# Patient Record
Sex: FEMALE | Race: WHITE | NOT HISPANIC OR LATINO | Employment: FULL TIME | URBAN - METROPOLITAN AREA
[De-identification: names, ages, dates, MRNs, and addresses within clinical notes are randomized per-mention and may not be internally consistent; named-entity substitution may affect disease eponyms.]

---

## 2018-07-02 ENCOUNTER — TRANSFERRED RECORDS (OUTPATIENT)
Dept: HEALTH INFORMATION MANAGEMENT | Facility: CLINIC | Age: 33
End: 2018-07-02

## 2018-07-12 ENCOUNTER — TELEPHONE (OUTPATIENT)
Dept: OTOLARYNGOLOGY | Facility: CLINIC | Age: 33
End: 2018-07-12

## 2018-07-12 NOTE — TELEPHONE ENCOUNTER
Health Call Center    Phone Message    May a detailed message be left on voicemail: no    Reason for Call: Other: New Patient, being Referred by [facility] for DX: Cyst on Salivary Gland + Cheek Abscess for surgery. Please follow-up as patient has had hospitalization for infection due to DX.      Action Taken: Message routed to:  [facility] ([facility] ent

## 2018-07-17 ENCOUNTER — TELEPHONE (OUTPATIENT)
Dept: CALL CENTER | Age: 33
End: 2018-07-17

## 2018-07-17 NOTE — TELEPHONE ENCOUNTER
Scheduled patient to see ENT 7/31, she wanted to urgently to speak to a nurse about the blood that was coming out of her ear. I asked her if anyone from the call center offered to transfer her to the red flags triage line, she stated no. I called over and transferred her to a nurse on the red flags triage line for further assistance on her bleeding ear.

## 2018-07-17 NOTE — TELEPHONE ENCOUNTER
[facility]: Nurse Triage Note  SITUATION/BACKGROUND                                                      Martita Salazar is a 32 year old female who calls with draining ear and pain.    Description:  Right ear drainage and bleeding  Onset/duration:  3 weeks ago  Precip. factors:  Swelling of right cheek with pain  Associated symptoms:  Low grade fever  Improves/worsens symptoms:  nothing  Pain scale (0-10)   NA    MEDICATIONS:   Taking medication(s) as prescribed? Yes 1 week of Augmentin PO and Cipro drops  Taking over the counter medication(s?) N/A  Any barriers to taking medication(s) as prescribed?  No  Medication(s) improving/managing symptoms?  No    Allergies: Allergies not on file    ASSESSMENT      Patient has been treated for the parotid abscess in [facility] - continues to have drainage with pain. Bright red blood at times. She has an appointment 7-31-18 to see Dr Holcomb in ENT. Appointment is changed to tomorrow at 10 AM with Dr Viera for more timely assessment and treatment.    RECOMMENDATION/PLAN                                                      RECOMMENDED DISPOSITION:  See in 24 hours -   Will comply with recommendation: Yes    If further questions/concerns or if symptoms do not improve, worsen or new symptoms develop, call your PCP or [phone] to talk with the Resident on call, as soon as possible.    Guideline used: Ear Drainage pg 200  Telephone Triage Protocols for Nurses, Fifth Edition, Julie Briggs Kathleen M. Doege, RN

## 2018-07-17 NOTE — TELEPHONE ENCOUNTER
FUTURE VISIT INFORMATION      FUTURE VISIT INFORMATION:    Date: 07/18/2018    Time: 10:00    Location: [facility]  REFERRAL INFORMATION:    Referring provider:  SELF    Referring providers clinic:  N/A    Reason for visit/diagnosis  Cyst on Salivary Gland + Cheek Abscess - ok per [facility] Flags Triage     RECORDS REQUESTED FROM:       Clinic name Comments Records Status Imaging Status   [facility] OFFICE VISIT: 06/29/2018  IMAGE: CT NECK 07/02/2018 INTERNAL YES                                   RECORDS STATUS

## 2018-07-17 NOTE — TELEPHONE ENCOUNTER
[facility] Call Center    Phone Message    May a detailed message be left on voicemail: yes    Reason for Call: Other: Patient called back in.  She now has had blood draining from her R ear for the past 24 hours with the first hour being the heaviest.  She contacted the referring provider and they sent her back to us.  I informed her they should still be following up with her as we havent treated her and if she has concerns she should always go to the nearest ED.   Please call asap to schedule.      Action Taken: routed to [facility]

## 2018-07-18 ENCOUNTER — OFFICE VISIT (OUTPATIENT)
Dept: OTOLARYNGOLOGY | Facility: CLINIC | Age: 33
End: 2018-07-18
Payer: COMMERCIAL

## 2018-07-18 ENCOUNTER — PRE VISIT (OUTPATIENT)
Dept: OTOLARYNGOLOGY | Facility: CLINIC | Age: 33
End: 2018-07-18

## 2018-07-18 ENCOUNTER — DOCUMENTATION ONLY (OUTPATIENT)
Dept: OTOLARYNGOLOGY | Facility: CLINIC | Age: 33
End: 2018-07-18

## 2018-07-18 VITALS — WEIGHT: 157 LBS | BODY MASS INDEX: 25.23 KG/M2 | HEIGHT: 66 IN

## 2018-07-18 DIAGNOSIS — K11.6 PAROTID CYST: ICD-10-CM

## 2018-07-18 DIAGNOSIS — H60.391 INFECTIVE OTITIS EXTERNA, RIGHT: Primary | ICD-10-CM

## 2018-07-18 RX ORDER — CIPROFLOXACIN AND DEXAMETHASONE 3; 1 MG/ML; MG/ML
2 SUSPENSION/ DROPS AURICULAR (OTIC) 2 TIMES DAILY
Qty: 1 BOTTLE | Refills: 1 | Status: SHIPPED | OUTPATIENT
Start: 2018-07-18 | End: 2023-06-12

## 2018-07-18 RX ORDER — CIPROFLOXACIN AND DEXAMETHASONE 3; 1 MG/ML; MG/ML
SUSPENSION/ DROPS AURICULAR (OTIC)
COMMUNITY
Start: 2018-07-09 | End: 2018-07-18

## 2018-07-18 RX ORDER — CLOTRIMAZOLE 1 G/ML
2 SOLUTION TOPICAL 2 TIMES DAILY
Qty: 28 ML | Refills: 1 | Status: SHIPPED | OUTPATIENT
Start: 2018-07-18 | End: 2018-07-25

## 2018-07-18 RX ORDER — IBUPROFEN 200 MG
400 TABLET ORAL EVERY 6 HOURS PRN
COMMUNITY
End: 2024-03-27

## 2018-07-18 ASSESSMENT — PAIN SCALES - GENERAL: PAINLEVEL: NO PAIN (0)

## 2018-07-18 NOTE — NURSING NOTE
"Chief Complaint   Patient presents with     Consult     cyst on salivary gland, cheek abscess     Height 1.67 m (5' 5.75\"), weight 71.2 kg (157 lb).    Conrad Dias LPN    "

## 2018-07-18 NOTE — MR AVS SNAPSHOT
After Visit Summary   7/18/2018    Martita Salazar    MRN: [id number]           Patient Information     Date Of Birth          1985        Visit Information        Provider Department      7/18/2018 10:00 AM Orlando Viera MD [facility] Ear Nose and Throat        Today's Diagnoses     Infective otitis externa, right    -  1    Parotid cyst          Care Instructions    1. Patient is scheduled for surgery on 8/8/18. You need to arrive at the hospital at 8:30am.   2. Patient to schedule a pre-op physical with their primary MD within 30 days of the procedure.   3. Patient to avoid blood thinning medications 1 week prior to surgery (Ibuprofen, Aleve, Aspirin, etc.)   4. Patient to review contents within the surgical packet & use the antiseptic scrub as directed.   5. Patient to call the ENT clinic with further questions or concerns: [phone].              Follow-ups after your visit        Follow-up notes from your care team     Return sched surgery.      Your next 10 appointments already scheduled     Aug 08, 2018   Procedure with Orlando Viera MD   [facility], [place], Same Day Surgery (--)    [address] [address] [phone]            Aug 15, 2018  9:30 AM CDT   (Arrive by 9:15 AM)   Return Visit with MD ZEINAB Rodriguez [facility] Ear Nose and Throat ([facility])    [address] [address] [phone]              Who to contact     Please call your clinic at [phone] to:    Ask questions about your health    Make or cancel appointments    Discuss your medicines    Learn about your test results    Speak to your doctor            Additional Information About Your Visit        MyChart Information     dooyoo is an electronic gateway that provides easy, online access to your medical records. With dooyoo, you can request a clinic appointment, read your test results, renew a prescription or  "communicate with your care team.     To sign up for BitTorrenthart visit the website at [email or web address]   You will be asked to enter the access code listed below, as well as some personal information. Please follow the directions to create your username and password.     Your access code is: [email or web address]  Expires: 10/16/2018  6:30 AM     Your access code will  in 90 days. If you need help or a new code, please contact your [facility] or call [phone] for assistance.        Care EveryWhere ID     This is your Care EveryWhere ID. This could be used by other organizations to access your [place] medical records  [id number]        Your Vitals Were     Height BMI (Body Mass Index)                1.67 m (5' 5.75\") 25.54 kg/m2           Blood Pressure from Last 3 Encounters:   No data found for BP    Weight from Last 3 Encounters:   18 71.2 kg (157 lb)              We Performed the Following     [facility]ima-Operative Worksheet (ENT Adult Default Surgery Request)          Today's Medication Changes          These changes are accurate as of 18 11:59 PM.  If you have any questions, ask your nurse or doctor.               Start taking these medicines.        Dose/Directions    clotrimazole 1 % solution   Commonly known as:  LOTRIMIN   Used for:  Infective otitis externa, right   Started by:  Orlando Viera MD        Dose:  2 mL   Apply 2 mLs topically 2 times daily for 7 days   Quantity:  28 mL   Refills:  1         These medicines have changed or have updated prescriptions.        Dose/Directions    CIPRODEX otic suspension   This may have changed:    - how much to take  - how to take this  - when to take this   Used for:  Infective otitis externa, right   Generic drug:  ciprofloxacin-dexamethasone   Changed by:  Orlando Viera MD        Dose:  2 drop   Place 2 drops into the right ear 2 times daily   Quantity:  1 Bottle   Refills:  1            Where to " get your medicines      These medications were sent to [facility] [address]  [address], [facility] [address]     Phone:  [phone]     CIPRODEX otic suspension    clotrimazole 1 % solution                Primary Care Provider    None Specified       No primary provider on file.        Equal Access to Services     [facility]: Hadii aad ku hadasho Soomaali, waaxda luqadaha, qaybta kaalmada adeegyada, troy perez hayaan adenaz verdealainaedouard richards . So [facility] [phone].    ATENCIÓN: Si habla español, tiene a vazquez disposición servicios gratuitos de asistencia lingüística. Llame al [phone].    We comply with applicable federal civil rights laws and [place] laws. We do not discriminate on the basis of race, color, national origin, age, disability, sex, sexual orientation, or gender identity.            Thank you!     Thank you for choosing [facility] EAR NOSE AND THROAT  for your care. Our goal is always to provide you with excellent care. Hearing back from our patients is one way we can continue to improve our services. Please take a few minutes to complete the written survey that you may receive in the mail after your visit with us. Thank you!             Your Updated Medication List - Protect others around you: Learn how to safely use, store and throw away your medicines at www.disposemymeds.org.          This list is accurate as of 7/18/18 11:59 PM.  Always use your most recent med list.                   Brand Name Dispense Instructions for use Diagnosis    amoxicillin-clavulanate 875-125 MG per tablet    AUGMENTIN          CIPRODEX otic suspension   Generic drug:  ciprofloxacin-dexamethasone     1 Bottle    Place 2 drops into the right ear 2 times daily    Infective otitis externa, right       clotrimazole 1 % solution    LOTRIMIN    28 mL    Apply 2 mLs topically 2 times daily for 7 days    Infective otitis externa, right       ibuprofen 200 MG  tablet    ADVIL/MOTRIN     Take 400 mg by mouth

## 2018-07-18 NOTE — NURSING NOTE
Teaching Flowsheet - ENT   Relevant Diagnosis: parotid cyst  Teaching Topic: Parotidectomy   :Person(s) involved in teaching: Patient and spouse      Motivation Level:  Asks Questions:   Yes  Eager to Learn:   Yes  Cooperative:   Yes  Receptive (willing/able to accept information):   Yes  Comments: Reviewed pre-op H and P,  NPO prior to  surgery,  pre-op scrub (given Hibiclens)  Reviewed post-op  cares , activity and pain.     Patient demonstrates understanding of the following:  Reason for the appointment, diagnosis and treatment plan:   Yes  Knowledge of proper use of medications and conditions for which they are ordered (with special attention to potential side effects or drug interactions):  stop aspirin products 1 week before surgery Yes  Which situations necessitate calling provider and whom to contact:   Yes  Nutritional needs and diet plan:   Yes  Pain management techniques:   Yes  Patient instructed on hand hygiene:  Yes  How and/when to access community resources:   Yes     Infection Prevention:  Patient   demonstrates understanding of the following:  Surgical procedure site care taught Yes  Signs and symptoms of infection taught Yes  Wound care taught Yes  Instructional Materials Used/Given: pre- op booklet,verbal  Instruction.    Thelma Montoya, RN, BSN

## 2018-07-18 NOTE — LETTER
7/18/2018       RE: Martita Salazar  [address] [facility] [address]     Dear Colleague,    Thank you for referring your patient, Martita Salazar, to the [facility] EAR NOSE AND THROAT at [facility]. Please see a copy of my visit note below.    HISTORY OF PRESENT ILLNESS:  Martita Salazar is a 32-year-old female who presents with right otalgia, otorrhea, and a right facial mass.  She feels it is slowly growing and has given her pain, not subjective of trigeminal neuralgia, but a more intense pain with a longer lasting effect.  She has no dysphagia, odynophagia, shortness of breath, fever, chills.  She has had no facial nerve weakness.      PAST MEDICAL HISTORY, MEDICATIONS, ALLERGIES:  Reviewed.  She has had no prior surgeries to the head and neck area.      REVIEW OF SYSTEMS:  Significant for the above.  She has no subjective hearing loss at this time.  In fact, the ear is dry on today's visit.  She has no TMJ history as well.      PHYSICAL EXAMINATION:  Examination shows mild fullness to the right tail of parotid area.  It is nontender on palpation.  There is no overlying erythema.  There is no ipsilateral neck adenopathy, and there is no thyromegaly.  The remainder of the head and neck examination is essentially unremarkable.  She is nontender on palpation to the TMJ itself.  The ear canal is dry.  The TM is intact.  There is no air fluid level.      IMAGING:  The CT scan from an outside source shows there is a 2.3 x 2.2 x 1.0 cm mass within the superficial lobe of the right parotid gland.  There is an ipsilateral node that is 1.1 cm in size at level IIA.  There is no other adenopathy.      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      PLAN:  I recommend excision of the lesion with facial nerve monitoring.  If indeed this shows to be positive on frozen section, we may extend to a neck dissection on the same side.  We will schedule this in short  order.     See dictated note for Right Parotid mass necessitating resection with facial nerve monitoring.  Orlando Viera MD DDS    Again, thank you for allowing me to participate in the care of your patient.      Sincerely,    Orlando Viera MD

## 2018-07-18 NOTE — PROGRESS NOTES
Patient scheduled for surgery with Dr Viera on 8/8/18 at  [facility] [place] while in ENT Clinic today.  Preop teaching done by RN Coordinator.

## 2018-07-18 NOTE — PATIENT INSTRUCTIONS
1. Patient is scheduled for surgery on 8/8/18. You need to arrive at the hospital at 8:30am.   2. Patient to schedule a pre-op physical with their primary MD within 30 days of the procedure.   3. Patient to avoid blood thinning medications 1 week prior to surgery (Ibuprofen, Aleve, Aspirin, etc.)   4. Patient to review contents within the surgical packet & use the antiseptic scrub as directed.   5. Patient to call the ENT clinic with further questions or concerns: [phone].

## 2018-08-07 RX ORDER — CLOTRIMAZOLE 1 G/ML
SOLUTION TOPICAL 2 TIMES DAILY
COMMUNITY
End: 2023-06-12

## 2018-08-08 ENCOUNTER — SURGERY (OUTPATIENT)
Age: 33
End: 2018-08-08

## 2018-08-08 ENCOUNTER — ANESTHESIA (OUTPATIENT)
Dept: SURGERY | Facility: CLINIC | Age: 33
End: 2018-08-08
Payer: COMMERCIAL

## 2018-08-08 ENCOUNTER — HOSPITAL ENCOUNTER (OUTPATIENT)
Facility: CLINIC | Age: 33
Discharge: HOME | End: 2018-08-08
Attending: OTOLARYNGOLOGY | Admitting: OTOLARYNGOLOGY
Payer: COMMERCIAL

## 2018-08-08 ENCOUNTER — ANESTHESIA EVENT (OUTPATIENT)
Dept: SURGERY | Facility: CLINIC | Age: 33
End: 2018-08-08
Payer: COMMERCIAL

## 2018-08-08 VITALS
RESPIRATION RATE: 16 BRPM | TEMPERATURE: 97.5 F | DIASTOLIC BLOOD PRESSURE: 84 MMHG | HEART RATE: 90 BPM | BODY MASS INDEX: 25.47 KG/M2 | HEIGHT: 66 IN | WEIGHT: 158.51 LBS | SYSTOLIC BLOOD PRESSURE: 104 MMHG | OXYGEN SATURATION: 96 %

## 2018-08-08 DIAGNOSIS — K11.6 PAROTID CYST: Primary | ICD-10-CM

## 2018-08-08 LAB
GLUCOSE BLDC GLUCOMTR-MCNC: 87 MG/DL (ref 70–99)
HCG UR QL: NEGATIVE

## 2018-08-08 PROCEDURE — 25000125 ZZHC RX 250: Performed by: OTOLARYNGOLOGY

## 2018-08-08 PROCEDURE — 25000125 ZZHC RX 250: Performed by: NURSE ANESTHETIST, CERTIFIED REGISTERED

## 2018-08-08 PROCEDURE — 27210794 ZZH OR GENERAL SUPPLY STERILE: Performed by: OTOLARYNGOLOGY

## 2018-08-08 PROCEDURE — C9399 UNCLASSIFIED DRUGS OR BIOLOG: HCPCS | Performed by: NURSE ANESTHETIST, CERTIFIED REGISTERED

## 2018-08-08 PROCEDURE — 71000027 ZZH RECOVERY PHASE 2 EACH 15 MINS: Performed by: OTOLARYNGOLOGY

## 2018-08-08 PROCEDURE — 37000009 ZZH ANESTHESIA TECHNICAL FEE, EACH ADDTL 15 MIN: Performed by: OTOLARYNGOLOGY

## 2018-08-08 PROCEDURE — 82962 GLUCOSE BLOOD TEST: CPT

## 2018-08-08 PROCEDURE — 40000170 ZZH STATISTIC PRE-PROCEDURE ASSESSMENT II: Performed by: OTOLARYNGOLOGY

## 2018-08-08 PROCEDURE — 25000565 ZZH ISOFLURANE, EA 15 MIN: Performed by: OTOLARYNGOLOGY

## 2018-08-08 PROCEDURE — 37000008 ZZH ANESTHESIA TECHNICAL FEE, 1ST 30 MIN: Performed by: OTOLARYNGOLOGY

## 2018-08-08 PROCEDURE — 88307 TISSUE EXAM BY PATHOLOGIST: CPT | Performed by: OTOLARYNGOLOGY

## 2018-08-08 PROCEDURE — 25000128 H RX IP 250 OP 636: Performed by: NURSE ANESTHETIST, CERTIFIED REGISTERED

## 2018-08-08 PROCEDURE — 81025 URINE PREGNANCY TEST: CPT | Performed by: ANESTHESIOLOGY

## 2018-08-08 PROCEDURE — 71000015 ZZH RECOVERY PHASE 1 LEVEL 2 EA ADDTL HR: Performed by: OTOLARYNGOLOGY

## 2018-08-08 PROCEDURE — 36000064 ZZH SURGERY LEVEL 4 EA 15 ADDTL MIN - UMMC: Performed by: OTOLARYNGOLOGY

## 2018-08-08 PROCEDURE — 25000128 H RX IP 250 OP 636: Performed by: OTOLARYNGOLOGY

## 2018-08-08 PROCEDURE — 88312 SPECIAL STAINS GROUP 1: CPT | Performed by: OTOLARYNGOLOGY

## 2018-08-08 PROCEDURE — 25000132 ZZH RX MED GY IP 250 OP 250 PS 637: Performed by: ANESTHESIOLOGY

## 2018-08-08 PROCEDURE — 25000128 H RX IP 250 OP 636: Performed by: ANESTHESIOLOGY

## 2018-08-08 PROCEDURE — 71000014 ZZH RECOVERY PHASE 1 LEVEL 2 FIRST HR: Performed by: OTOLARYNGOLOGY

## 2018-08-08 PROCEDURE — 36000062 ZZH SURGERY LEVEL 4 1ST 30 MIN - UMMC: Performed by: OTOLARYNGOLOGY

## 2018-08-08 RX ORDER — LIDOCAINE HYDROCHLORIDE 20 MG/ML
INJECTION, SOLUTION INFILTRATION; PERINEURAL PRN
Status: DISCONTINUED | OUTPATIENT
Start: 2018-08-08 | End: 2018-08-08

## 2018-08-08 RX ORDER — OXYCODONE HYDROCHLORIDE 5 MG/1
5 TABLET ORAL
Status: DISCONTINUED | OUTPATIENT
Start: 2018-08-08 | End: 2018-08-08 | Stop reason: HOSPADM

## 2018-08-08 RX ORDER — NALOXONE HYDROCHLORIDE 0.4 MG/ML
.1-.4 INJECTION, SOLUTION INTRAMUSCULAR; INTRAVENOUS; SUBCUTANEOUS
Status: DISCONTINUED | OUTPATIENT
Start: 2018-08-08 | End: 2018-08-08 | Stop reason: HOSPADM

## 2018-08-08 RX ORDER — PROPOFOL 10 MG/ML
INJECTION, EMULSION INTRAVENOUS PRN
Status: DISCONTINUED | OUTPATIENT
Start: 2018-08-08 | End: 2018-08-08

## 2018-08-08 RX ORDER — ONDANSETRON 4 MG/1
4 TABLET, ORALLY DISINTEGRATING ORAL EVERY 30 MIN PRN
Status: DISCONTINUED | OUTPATIENT
Start: 2018-08-08 | End: 2018-08-08 | Stop reason: HOSPADM

## 2018-08-08 RX ORDER — DEXAMETHASONE SODIUM PHOSPHATE 4 MG/ML
INJECTION, SOLUTION INTRA-ARTICULAR; INTRALESIONAL; INTRAMUSCULAR; INTRAVENOUS; SOFT TISSUE PRN
Status: DISCONTINUED | OUTPATIENT
Start: 2018-08-08 | End: 2018-08-08

## 2018-08-08 RX ORDER — BACITRACIN 500 [USP'U]/G
OINTMENT OPHTHALMIC PRN
Status: DISCONTINUED | OUTPATIENT
Start: 2018-08-08 | End: 2018-08-08 | Stop reason: HOSPADM

## 2018-08-08 RX ORDER — SODIUM CHLORIDE, SODIUM LACTATE, POTASSIUM CHLORIDE, CALCIUM CHLORIDE 600; 310; 30; 20 MG/100ML; MG/100ML; MG/100ML; MG/100ML
INJECTION, SOLUTION INTRAVENOUS CONTINUOUS
Status: DISCONTINUED | OUTPATIENT
Start: 2018-08-08 | End: 2018-08-08 | Stop reason: HOSPADM

## 2018-08-08 RX ORDER — AMOXICILLIN 250 MG
1-2 CAPSULE ORAL 2 TIMES DAILY
Qty: 30 TABLET | Refills: 0 | Status: SHIPPED | OUTPATIENT
Start: 2018-08-08 | End: 2023-06-12

## 2018-08-08 RX ORDER — ONDANSETRON 2 MG/ML
4 INJECTION INTRAMUSCULAR; INTRAVENOUS EVERY 30 MIN PRN
Status: DISCONTINUED | OUTPATIENT
Start: 2018-08-08 | End: 2018-08-08 | Stop reason: HOSPADM

## 2018-08-08 RX ORDER — FENTANYL CITRATE 50 UG/ML
INJECTION, SOLUTION INTRAMUSCULAR; INTRAVENOUS PRN
Status: DISCONTINUED | OUTPATIENT
Start: 2018-08-08 | End: 2018-08-08

## 2018-08-08 RX ORDER — OXYCODONE HYDROCHLORIDE 5 MG/1
5-10 TABLET ORAL
Qty: 20 TABLET | Refills: 0 | Status: SHIPPED | OUTPATIENT
Start: 2018-08-08 | End: 2023-06-12

## 2018-08-08 RX ORDER — FENTANYL CITRATE 50 UG/ML
25-50 INJECTION, SOLUTION INTRAMUSCULAR; INTRAVENOUS
Status: DISCONTINUED | OUTPATIENT
Start: 2018-08-08 | End: 2018-08-08 | Stop reason: HOSPADM

## 2018-08-08 RX ORDER — ONDANSETRON 2 MG/ML
INJECTION INTRAMUSCULAR; INTRAVENOUS PRN
Status: DISCONTINUED | OUTPATIENT
Start: 2018-08-08 | End: 2018-08-08

## 2018-08-08 RX ORDER — HYDROMORPHONE HYDROCHLORIDE 1 MG/ML
.3-.5 INJECTION, SOLUTION INTRAMUSCULAR; INTRAVENOUS; SUBCUTANEOUS EVERY 5 MIN PRN
Status: DISCONTINUED | OUTPATIENT
Start: 2018-08-08 | End: 2018-08-08 | Stop reason: HOSPADM

## 2018-08-08 RX ORDER — LIDOCAINE 40 MG/G
CREAM TOPICAL
Status: DISCONTINUED | OUTPATIENT
Start: 2018-08-08 | End: 2018-08-08 | Stop reason: HOSPADM

## 2018-08-08 RX ORDER — LIDOCAINE HYDROCHLORIDE AND EPINEPHRINE 10; 10 MG/ML; UG/ML
INJECTION, SOLUTION INFILTRATION; PERINEURAL PRN
Status: DISCONTINUED | OUTPATIENT
Start: 2018-08-08 | End: 2018-08-08 | Stop reason: HOSPADM

## 2018-08-08 RX ORDER — CLINDAMYCIN PHOSPHATE 600 MG/50ML
600 INJECTION, SOLUTION INTRAVENOUS
Status: COMPLETED | OUTPATIENT
Start: 2018-08-08 | End: 2018-08-08

## 2018-08-08 RX ADMIN — SUGAMMADEX 150 MG: 100 INJECTION, SOLUTION INTRAVENOUS at 14:51

## 2018-08-08 RX ADMIN — HYDROMORPHONE HYDROCHLORIDE 0.5 MG: 1 INJECTION, SOLUTION INTRAMUSCULAR; INTRAVENOUS; SUBCUTANEOUS at 14:45

## 2018-08-08 RX ADMIN — LIDOCAINE HYDROCHLORIDE 25 MG: 20 INJECTION, SOLUTION INFILTRATION; PERINEURAL at 11:21

## 2018-08-08 RX ADMIN — PROCHLORPERAZINE EDISYLATE 10 MG: 5 INJECTION INTRAMUSCULAR; INTRAVENOUS at 15:53

## 2018-08-08 RX ADMIN — LIDOCAINE HYDROCHLORIDE,EPINEPHRINE BITARTRATE 3 ML: 10; .01 INJECTION, SOLUTION INFILTRATION; PERINEURAL at 11:47

## 2018-08-08 RX ADMIN — ONDANSETRON 4 MG: 2 INJECTION INTRAMUSCULAR; INTRAVENOUS at 14:48

## 2018-08-08 RX ADMIN — ONDANSETRON HYDROCHLORIDE 4 MG: 2 INJECTION, SOLUTION INTRAMUSCULAR; INTRAVENOUS at 15:21

## 2018-08-08 RX ADMIN — SODIUM CHLORIDE, POTASSIUM CHLORIDE, SODIUM LACTATE AND CALCIUM CHLORIDE: 600; 310; 30; 20 INJECTION, SOLUTION INTRAVENOUS at 15:21

## 2018-08-08 RX ADMIN — CLINDAMYCIN IN 5 PERCENT DEXTROSE 600 MG: 12 INJECTION, SOLUTION INTRAVENOUS at 11:35

## 2018-08-08 RX ADMIN — DEXAMETHASONE SODIUM PHOSPHATE 4 MG: 4 INJECTION, SOLUTION INTRA-ARTICULAR; INTRALESIONAL; INTRAMUSCULAR; INTRAVENOUS; SOFT TISSUE at 12:01

## 2018-08-08 RX ADMIN — FENTANYL CITRATE 50 MCG: 50 INJECTION, SOLUTION INTRAMUSCULAR; INTRAVENOUS at 13:36

## 2018-08-08 RX ADMIN — BACITRACIN 5 INCH: 500 OINTMENT OPHTHALMIC at 14:44

## 2018-08-08 RX ADMIN — ROCURONIUM BROMIDE 40 MG: 10 INJECTION INTRAVENOUS at 11:21

## 2018-08-08 RX ADMIN — REMIFENTANIL HYDROCHLORIDE 0.05 MCG/KG/MIN: 1 INJECTION, POWDER, LYOPHILIZED, FOR SOLUTION INTRAVENOUS at 11:43

## 2018-08-08 RX ADMIN — MIDAZOLAM 2 MG: 1 INJECTION INTRAMUSCULAR; INTRAVENOUS at 11:08

## 2018-08-08 RX ADMIN — FENTANYL CITRATE 50 MCG: 50 INJECTION, SOLUTION INTRAMUSCULAR; INTRAVENOUS at 11:08

## 2018-08-08 RX ADMIN — PROPOFOL 200 MG: 10 INJECTION, EMULSION INTRAVENOUS at 11:21

## 2018-08-08 RX ADMIN — SODIUM CHLORIDE, POTASSIUM CHLORIDE, SODIUM LACTATE AND CALCIUM CHLORIDE: 600; 310; 30; 20 INJECTION, SOLUTION INTRAVENOUS at 11:08

## 2018-08-08 RX ADMIN — OXYCODONE HYDROCHLORIDE 5 MG: 5 TABLET ORAL at 17:58

## 2018-08-08 ASSESSMENT — VISUAL ACUITY: OU: NORMAL ACUITY

## 2018-08-08 ASSESSMENT — PAIN DESCRIPTION - DESCRIPTORS
DESCRIPTORS: ACHING
DESCRIPTORS: ACHING
DESCRIPTORS: SORE

## 2018-08-08 NOTE — IP AVS SNAPSHOT
Same Day Surgery [address] [address]    Phone:  [phone]                                       After Visit Summary   8/8/2018    Martita Salazar    MRN: [id number]           After Visit Summary Signature Page     I have received my discharge instructions, and my questions have been answered. I have discussed any challenges I see with this plan with the nurse or doctor.    ..........................................................................................................................................  Patient/Patient Representative Signature      ..........................................................................................................................................  Patient Representative Print Name and Relationship to Patient    ..................................................               ................................................  Date                                            Time    ..........................................................................................................................................  Reviewed by Signature/Title    ...................................................              ..............................................  Date                                                            Time

## 2018-08-08 NOTE — ANESTHESIA CARE TRANSFER NOTE
Patient: Martita Salazar    Procedure(s):  Right Parotidectomy - Wound Class: I-Clean    Diagnosis: Right Infective Otitis Externa   Diagnosis Additional Information: No value filed.    Anesthesia Type:   General     Note:  Airway :Face Mask  Patient transferred to:PACU  Handoff Report: Identifed the Patient, Identified the Reponsible Provider, Reviewed the pertinent medical history, Discussed the surgical course, Reviewed Intra-OP anesthesia mangement and issues during anesthesia, Set expectations for post-procedure period and Allowed opportunity for questions and acknowledgement of understanding      Vitals: (Last set prior to Anesthesia Care Transfer)    CRNA VITALS  8/8/2018 1440 - 8/8/2018 1511      8/8/2018             Ht Rate: 87    SpO2: 100 %    Resp Rate (observed): 16    EKG: NSR                Electronically Signed By: JENSEN Tavarez CRNA  August 8, 2018  3:11 PM

## 2018-08-08 NOTE — DISCHARGE INSTRUCTIONS
Same-Day Surgery   Adult Discharge Orders & Instructions     For 24 hours after surgery:  1. Get plenty of rest.  A responsible adult must stay with you for at least 24 hours after you leave the hospital.   2. Pain medication can slow your reflexes. Do not drive or use heavy equipment.  If you have weakness or tingling, don't drive or use heavy equipment until this feeling goes away.  3. Mixing alcohol and pain medication can cause dizziness and slow your breathing. It can even be fatal. Do not drink alcohol while taking pain medication.  4. Avoid strenuous or risky activities.  Ask for help when climbing stairs.   5. You may feel lightheaded.  If so, sit for a few minutes before standing.  Have someone help you get up.   6. If you have nausea (feel sick to your stomach), drink only clear liquids such as apple juice, ginger ale, broth or 7-Up.  Rest may also help.  Be sure to drink enough fluids.  Move to a regular diet as you feel able. Take pain medications with a small amount of solid food, such as toast or crackers, to avoid nausea.   7. A slight fever is normal. Call the doctor if your fever is over 100 F (37.7 C) (taken under the tongue) or lasts longer than 24 hours.  8. You may have a dry mouth, muscle aches, trouble sleeping or a sore throat.  These symptoms should go away after 24 hours.  9. Do not make important or legal decisions.   Pain Management:      1. Take pain medication (if prescribed) for pain as directed by your physician.        2. WARNING: If the pain medication you have been prescribed contains Tylenol  (acetaminophen), DO NOT take additional doses of Tylenol (acetaminophen).     Call your doctor for any of the followin.  Signs of infection (fever, growing tenderness at the surgery site, severe pain, a large amount of drainage or bleeding, foul-smelling drainage, redness, swelling).    2.  It has been over 8 to 10 hours since surgery and you are still not able to urinate (pee).    3.   Headache for over 24 hours.    4.  Numbness, tingling or weakness the day after surgery (if you had spinal anesthesia).  To contact a doctor, call _Dr. VieraUqgdaf___[phone]____hz:      [phone] and ask for the Resident On Call for:          ______ENT____________________________________ (answered 24 hours a day)      Emergency Department:  [place] [facility]: [phone]  [place] [facility]: [phone]               Rev. 10/2014

## 2018-08-08 NOTE — ANESTHESIA PREPROCEDURE EVALUATION
Anesthesia Evaluation     . Pt has had prior anesthetic. Type: General    No history of anesthetic complications          ROS/MED HX    ENT/Pulmonary:  - neg pulmonary ROS     Neurologic:  - neg neurologic ROS   (+)CVA     Cardiovascular:  - neg cardiovascular ROS       METS/Exercise Tolerance:  >4 METS   Hematologic:  - neg hematologic  ROS       Musculoskeletal: Comment: R sided parotid mass        GI/Hepatic:  - neg GI/hepatic ROS       Renal/Genitourinary:  - ROS Renal section negative       Endo:  - neg endo ROS       Psychiatric:  - neg psychiatric ROS       Infectious Disease:  - neg infectious disease ROS       Malignancy:      - no malignancy   Other:    (+) No chance of pregnancy                    Physical Exam  Normal systems: cardiovascular, pulmonary and dental    Airway   Mallampati: II  TM distance: >3 FB  Neck ROM: full    Dental     Cardiovascular       Pulmonary                     Anesthesia Plan      History & Physical Review  History and physical reviewed and following examination; no interval change.    ASA Status:  1 .    NPO Status:  > 8 hours    Plan for General with Intravenous induction. Maintenance will be TIVA.    PONV prophylaxis:  Ondansetron (or other 5HT-3) and Dexamethasone or Solumedrol       Postoperative Care  Postoperative pain management:  IV analgesics.      Consents  Anesthetic plan, risks, benefits and alternatives discussed with:  Patient..              Procedure: Procedure(s):  Right Parotidectomy - Wound Class: I-Clean    HPI: Martita Salazar is a 32 year old female scheduled for R parotidectomy for parotid cyst.  PMH unremarkable.  Had previous anesthesia for TL w/o complications or adverse reactions.  Plan for GETA, standard ASA monitors, single IV.    PMHx/PSHx:  Past Medical History:   Diagnosis Date     Allergic rhinitis        Past Surgical History:   Procedure Laterality Date     GYN SURGERY           No current facility-administered medications on file prior to  encounter.   Current Outpatient Prescriptions on File Prior to Encounter:  CIPRODEX otic suspension Place 2 drops into the right ear 2 times daily (Patient taking differently: Place 2 drops into the right ear every other day )   ibuprofen (ADVIL/MOTRIN) 200 MG tablet Take 400 mg by mouth every 6 hours as needed        Social Hx:   Social History   Substance Use Topics     Smoking status: Never Smoker     Smokeless tobacco: Never Used     Alcohol use Yes       Allergies:   Allergies   Allergen Reactions     Azithromycin Nausea and Vomiting         NPO Status: Per ASA Guidelines    Labs:    Blood Bank:  No results found for: ABO, RH, AS  BMP:  No results for input(s): NA, POTASSIUM, CHLORIDE, CO2, BUN, CR, GLC, SIERRA in the last 69829 hours.  CBC:   No results for input(s): WBC, RBC, HGB, HCT, MCV, MCH, MCHC, RDW, PLT in the last 56909 hours.  Coags:  No results for input(s): INR, PTT, FIBR in the last 18049 hours.    Anurag Silva MD  Staff Anesthesiologist  [phone]

## 2018-08-08 NOTE — OR NURSING
Report recd from Guillermo RN, pt family at bedside, pt advised of delay d/t previous case running over, pt and family verbalized understanding

## 2018-08-08 NOTE — BRIEF OP NOTE
[facility], [place]    Brief Operative Note    Pre-operative diagnosis: Right Infective Otitis Externa   Post-operative diagnosis * No post-op diagnosis entered *  Procedure: Procedure(s):  Right Parotidectomy - Wound Class: I-Clean  Surgeon: Surgeon(s) and Role:     * Orlando Viera MD - Primary     * Nancy Navarro MD - Resident - Assisting  Anesthesia: General   Estimated blood loss: * No values recorded between 8/8/2018 12:00 PM and 8/8/2018  3:01 PM *  Drains: Bill-Ervin  Specimens:   ID Type Source Tests Collected by Time Destination   A : Right Supeficial Parotidectomy  Tissue Other SURGICAL PATHOLOGY EXAM Orlando Viera MD 8/8/2018  2:19 PM      Findings:   None.  Complications: None.  Implants: None.

## 2018-08-08 NOTE — ANESTHESIA POSTPROCEDURE EVALUATION
Patient: Martita Salazar    Procedure(s):  Right Parotidectomy - Wound Class: I-Clean    Diagnosis:Right Infective Otitis Externa   Diagnosis Additional Information: No value filed.    Anesthesia Type:  General    Note:  Anesthesia Post Evaluation    Patient location during evaluation: PACU  Patient participation: Able to fully participate in evaluation  Level of consciousness: awake and alert  Pain management: adequate  Airway patency: patent  Cardiovascular status: acceptable  Respiratory status: acceptable  Hydration status: acceptable  PONV: controlled     Anesthetic complications: None          Last vitals:  Vitals:    08/08/18 1630 08/08/18 1645 08/08/18 1700   BP: 100/56 100/62 99/61   Resp: 16 14 16   Temp:      SpO2: 95% 95% 97%         Electronically Signed By: Hong Lincoln MD  August 8, 2018  5:15 PM

## 2018-08-08 NOTE — IP AVS SNAPSHOT
"                  MRN:[id number]                      After Visit Summary   8/8/2018    Martita Salazar    MRN: [id number]           Thank you!     Thank you for choosing [facility] for your care. Our goal is always to provide you with excellent care. Hearing back from our patients is one way we can continue to improve our services. Please take a few minutes to complete the written survey that you may receive in the mail after you visit with us. Thank you!        Patient Information     Date Of Birth          1985        About your hospital stay     You were admitted on:  August 8, 2018 You last received care in the:  Same Day Surgery [facility]    You were discharged on:  August 8, 2018       Who to Call     For medical emergencies, please call 911.  For non-urgent questions about your medical care, please call your primary care provider or clinic, [phone]  For questions related to your surgery, please call your surgery clinic        Attending Provider     Provider Orlando Tony MD Otolaryngology       Primary Care Provider Office Phone # Fax #    William Zaragoza [phone]      After Care Instructions     Diet Instructions       Resume pre procedure diet            No Alcohol       For 24 hours following procedure  or while taking narcotic pain medication            No driving or operating machinery       until the day after procedure or while taking narcotic pain medication            Notify Physician        Please notify your doctor if you experience wound breakdown, sustained bleeding from the wound site, or increasing redness, swelling, and/or purulent malorodorous discharge from the wound site which may indicate infection. If you feel it is acute, please return to the emergency department. If you have questions or concerns during the day please call ENT clinic at [phone]. If at night you can call [facility] at [phone] and ask for the \"ENT " "resident on call\".            Wound care       Please keep track of drain output    Keep the wound moist with Aquaphor or vaseline ointment thre times daily    Ok to shower from neck down, wash hair over a sink until drain comes out                  Your next 10 appointments already scheduled     Aug 15, 2018  9:30 AM CDT   (Arrive by 9:15 AM)   Return Visit with Orlando Viera MD   [facility] ([facility])    [address]  4th Floor  [place] [address] [phone]              Further instructions from your care team       Same-Day Surgery   Adult Discharge Orders & Instructions     For 24 hours after surgery:  1. Get plenty of rest.  A responsible adult must stay with you for at least 24 hours after you leave the hospital.   2. Pain medication can slow your reflexes. Do not drive or use heavy equipment.  If you have weakness or tingling, don't drive or use heavy equipment until this feeling goes away.  3. Mixing alcohol and pain medication can cause dizziness and slow your breathing. It can even be fatal. Do not drink alcohol while taking pain medication.  4. Avoid strenuous or risky activities.  Ask for help when climbing stairs.   5. You may feel lightheaded.  If so, sit for a few minutes before standing.  Have someone help you get up.   6. If you have nausea (feel sick to your stomach), drink only clear liquids such as apple juice, ginger ale, broth or 7-Up.  Rest may also help.  Be sure to drink enough fluids.  Move to a regular diet as you feel able. Take pain medications with a small amount of solid food, such as toast or crackers, to avoid nausea.   7. A slight fever is normal. Call the doctor if your fever is over 100 F (37.7 C) (taken under the tongue) or lasts longer than 24 hours.  8. You may have a dry mouth, muscle aches, trouble sleeping or a sore throat.  These symptoms should go away after 24 hours.  9. Do not make important or " "legal decisions.   Pain Management:      1. Take pain medication (if prescribed) for pain as directed by your physician.        2. WARNING: If the pain medication you have been prescribed contains Tylenol  (acetaminophen), DO NOT take additional doses of Tylenol (acetaminophen).     Call your doctor for any of the followin.  Signs of infection (fever, growing tenderness at the surgery site, severe pain, a large amount of drainage or bleeding, foul-smelling drainage, redness, swelling).    2.  It has been over 8 to 10 hours since surgery and you are still not able to urinate (pee).    3.  Headache for over 24 hours.    4.  Numbness, tingling or weakness the day after surgery (if you had spinal anesthesia).  To contact a doctor, call _Dr. VieraPdgqlj___[phone]____kk:      [phone] and ask for the Resident On Call for:          ______ENT____________________________________ (answered 24 hours a day)      Emergency Department:  [facility]: [phone]  [facility]: [phone]               Rev. 10/2014     Additional Information     If you use hormonal birth control (such as the pill, patch, ring or implants): You'll need a second form of birth control for 7 days (condoms, a diaphragm or contraceptive foam). While in the hospital, you received a medicine called Bridion. Your normal birth control will not work as well for a week after taking this medicine.          Pending Results     Date and Time Order Name Status Description    2018 1420 Surgical pathology exam In process             Admission Information     Date & Time Provider Department Dept. Phone    2018 Orlando Viera MD Same Day Surgery [facility] [place] [phone]      Your Vitals Were     Blood Pressure Temperature Respirations Height Weight       99/60 98.5  F (36.9  C) (Oral) 18 1.67 m (5' 5.75\") 71.9 kg (158 lb 8.2 oz)     Pulse Oximetry BMI (Body Mass Index)                96% 25.78 kg/m2 " "         MyChart Information     [employer] lets you send messages to your doctor, view your test results, renew your prescriptions, schedule appointments and more. To sign up, go to [email or web address] . Click on \"Log in\" on the left side of the screen, which will take you to the Welcome page. Then click on \"Sign up Now\" on the right side of the page.     You will be asked to enter the access code listed below, as well as some personal information. Please follow the directions to create your username and password.     Your access code is: [email or web address]  Expires: 10/16/2018  6:30 AM     Your access code will  in 90 days. If you need help or a new code, please call your [facility] clinic or [phone].        Care EveryWhere ID     This is your Care EveryWhere ID. This could be used by other organizations to access your [facility] medical records  [id number]        Equal Access to Services     [place] LOWE AH: Laurie Nicholson, wafco waldrop, qaybta kaalmazeyn adecoreen, troy richards . So [facility] [phone].    ATENCIÓN: Si emanuel coleman, tiene a vazquez disposición servicios gratuitos de asistencia lingüística. Llame al [phone].    We comply with applicable federal civil rights laws and [place] laws. We do not discriminate on the basis of race, color, national origin, age, disability, sex, sexual orientation, or gender identity.               Review of your medicines      START taking        Dose / Directions    oxyCODONE IR 5 MG tablet   Commonly known as:  ROXICODONE   Used for:  Parotid cyst        Dose:  5-10 mg   Take 1-2 tablets (5-10 mg) by mouth every 3 hours as needed for pain or other (Moderate to Severe)   Quantity:  20 tablet   Refills:  0       senna-docusate 8.6-50 MG per tablet   Commonly known as:  SENOKOT-S;PERICOLACE   Used for:  Parotid cyst        Dose:  1-2 tablet   Take 1-2 tablets by mouth 2 times daily Take while on oral narcotics to prevent or " treat constipation.   Quantity:  30 tablet   Refills:  0         CONTINUE these medicines which may have CHANGED, or have new prescriptions. If we are uncertain of the size of tablets/capsules you have at home, strength may be listed as something that might have changed.        Dose / Directions    CIPRODEX otic suspension   This may have changed:  when to take this   Used for:  Infective otitis externa, right   Generic drug:  ciprofloxacin-dexamethasone        Dose:  2 drop   Place 2 drops into the right ear 2 times daily   Quantity:  1 Bottle   Refills:  1         CONTINUE these medicines which have NOT CHANGED        Dose / Directions    clotrimazole 1 % solution   Commonly known as:  LOTRIMIN   Indication:  Right ear, take every other day        Apply topically 2 times daily   Refills:  0       DIFLUCAN PO        Dose:  150 mg   Take 150 mg by mouth once   Refills:  0       ibuprofen 200 MG tablet   Commonly known as:  ADVIL/MOTRIN        Dose:  400 mg   Take 400 mg by mouth every 6 hours as needed   Refills:  0            Where to get your medicines      These medications were sent to [address] [address]     Phone:  [phone]     senna-docusate 8.6-50 MG per tablet         Some of these will need a paper prescription and others can be bought over the counter. Ask your nurse if you have questions.     Bring a paper prescription for each of these medications     oxyCODONE IR 5 MG tablet                Protect others around you: Learn how to safely use, store and throw away your medicines at www.disposemymeds.org.        ANTIBIOTIC INSTRUCTION     You've Been Prescribed an Antibiotic - Now What?  Your healthcare team thinks that you or your loved one might have an infection. Some infections can be treated with antibiotics, which are powerful, life-saving drugs. Like all medications, antibiotics have side effects and  should only be used when necessary. There are some important things you should know about your antibiotic treatment.      Your healthcare team may run tests before you start taking an antibiotic.    Your team may take samples (e.g., from your blood, urine or other areas) to run tests to look for bacteria. These test can be important to determine if you need an antibiotic at all and, if you do, which antibiotic will work best.      Within a few days, your healthcare team might change or even stop your antibiotic.    Your team may start you on an antibiotic while they are working to find out what is making you sick.    Your team might change your antibiotic because test results show that a different antibiotic would be better to treat your infection.    In some cases, once your team has more information, they learn that you do not need an antibiotic at all. They may find out that you don't have an infection, or that the antibiotic you're taking won't work against your infection. For example, an infection caused by a virus can't be treated with antibiotics. Staying on an antibiotic when you don't need it is more likely to be harmful than helpful.      You may experience side effects from your antibiotic.    Like all medications, antibiotics have side effects. Some of these can be serious.    Let you healthcare team know if you have any known allergies when you are admitted to the hospital.    One significant side effect of nearly all antibiotics is the risk of severe and sometimes deadly diarrhea caused by Clostridium difficile (C. Difficile). This occurs when a person takes antibiotics because some good germs are destroyed. Antibiotic use allows C. diificile to take over, putting patients at high risk for this serious infection.    As a patient or caregiver, it is important to understand your or your loved one's antibiotic treatment. It is especially important for caregivers to speak up when patients can't speak for  themselves. Here are some important questions to ask your healthcare team.    What infection is this antibiotic treating and how do you know I have that infection?    What side effects might occur from this antibiotic?    How long will I need to take this antibiotic?    Is it safe to take this antibiotic with other medications or supplements (e.g., vitamins) that I am taking?     Are there any special directions I need to know about taking this antibiotic? For example, should I take it with food?    How will I be monitored to know whether my infection is responding to the antibiotic?    What tests may help to make sure the right antibiotic is prescribed for me?      Information provided by:  www.cdc.gov/getsmart  U.S. Department of Health and Human Services  Centers for disease Control and Prevention  National Center for Emerging and Zoonotic Infectious Diseases  Division of Healthcare Quality Promotion        Information about OPIOIDS     PRESCRIPTION OPIOIDS: WHAT YOU NEED TO KNOW   We gave you an opioid (narcotic) pain medicine. It is important to manage your pain, but opioids are not always the best choice. You should first try all the other options your care team gave you. Take this medicine for as short a time (and as few doses) as possible.    Some activities can increase your pain, such as bandage changes or therapy sessions. It may help to take your pain medicine 30 to 60 minutes before these activities. Reduce your stress by getting enough sleep, working on hobbies you enjoy and practicing relaxation or meditation. Talk to your care team about ways to manage your pain beyond prescription opioids.    These medicines have risks:    DO NOT drive when on new or higher doses of pain medicine. These medicines can affect your alertness and reaction times, and you could be arrested for driving under the influence (DUI). If you need to use opioids long-term, talk to your care team about driving.    DO NOT operate  heavy machinery    DO NOT do any other dangerous activities while taking these medicines.    DO NOT drink any alcohol while taking these medicines.     If the opioid prescribed includes acetaminophen, DO NOT take with any other medicines that contain acetaminophen. Read all labels carefully. Look for the word  acetaminophen  or  Tylenol.  Ask your pharmacist if you have questions or are unsure.    You can get addicted to pain medicines, especially if you have a history of addiction (chemical, alcohol or substance dependence). Talk to your care team about ways to reduce this risk.    All opioids tend to cause constipation. Drink plenty of water and eat foods that have a lot of fiber, such as fruits, vegetables, prune juice, apple juice and high-fiber cereal. Take a laxative (Miralax, milk of magnesia, Colace, Senna) if you don t move your bowels at least every other day. Other side effects include upset stomach, sleepiness, dizziness, throwing up, tolerance (needing more of the medicine to have the same effect), physical dependence and slowed breathing.    Store your pills in a secure place, locked if possible. We will not replace any lost or stolen medicine. If you don t finish your medicine, please throw away (dispose) as directed by your pharmacist. The [place] Pollution Control Agency has more information about safe disposal: https://www.pca.[facility].[place].us/living-green/managing-unwanted-medications             Medication List: This is a list of all your medications and when to take them. Check marks below indicate your daily home schedule. Keep this list as a reference.      Medications           Morning Afternoon Evening Bedtime As Needed    CIPRODEX otic suspension   Place 2 drops into the right ear 2 times daily   Generic drug:  ciprofloxacin-dexamethasone                                clotrimazole 1 % solution   Commonly known as:  LOTRIMIN   Apply topically 2 times daily                                 DIFLUCAN PO   Take 150 mg by mouth once                                ibuprofen 200 MG tablet   Commonly known as:  ADVIL/MOTRIN   Take 400 mg by mouth every 6 hours as needed                                oxyCODONE IR 5 MG tablet   Commonly known as:  ROXICODONE   Take 1-2 tablets (5-10 mg) by mouth every 3 hours as needed for pain or other (Moderate to Severe)                                senna-docusate 8.6-50 MG per tablet   Commonly known as:  SENOKOT-S;PERICOLACE   Take 1-2 tablets by mouth 2 times daily Take while on oral narcotics to prevent or treat constipation.                                          More Information        Discharge Instructions: Caring for Your Bill-Ervin Drainage Tube  Your doctor discharges you with a Bill-Ervin drainage tube. Doctors commonly leave this drain within the abdominal cavity after surgery. It helps drain and collect blood and body fluid after surgery. This can prevent swelling and reduces the risk for infection. The tube is held in place by a few stitches. It is covered with a bandage. Your doctor will remove the drain when he or she determines you no longer need it.  Home care    Don t sleep on the same side as the tube.    Secure the tube and bag inside your clothing with a safety pin. This helps keep the tube from being pulled out.    Empty your drain at least twice a day. Empty it more often if the drain is full. Wash  and dry your hands before emptying the drain.  ? Lift the opening on the drain.  ? Drain the fluid into a measuring cup.  ? Record the amount of fluid each time you empty the drain. Include the date and time it was emptied. Share this information with your doctor on your next visit.  ? Squeeze the bulb with your hands until you hear air coming out of the bulb if your doctor has instructed you to do so (sometimes the bulb is used as a reservoir without suction). Check with your doctor about specific drain instructions.  ? Close the  opening.    Change the dressing around the tube every day.  ? Wash your hands.  ? Remove the old bandage.  ? Wash your hands again.  ? Wet a cotton swab and clean the skin around the incision and tube site. Use normal saline solution (salt and water). Or, you can use warm, soapy water.  ? Put a new bandage on the incision and tube site. Make the bandage large enough to cover the whole incision area.  ? Tape the bandage in place.    Keep the bandage and tube site dry when you shower. Ask your healthcare provider about the best way to do this.     Stripping  the tube helps keep blood clots from blocking the tube. Ask your nurse how often you should strip the tube. Stripping may not be needed, depending on where and why your doctor placed the tube. It may even be dangerous in some cases.   ? Hold the tubing where it leaves the skin, with one hand. This keeps it from pulling on the skin.  ? Pinch the tubing with the thumb and first finger of your other hand.  ? Slowly and firmly pull your thumb and first finger down the tubing. You may find it helpful to hold an alcohol swab between your fingers and the tube to lubricate the tubing.  ? If the pulling hurts or feels like it s coming out of the skin, stop. Begin again more gently.  Follow-up care  Make a follow-up appointment as directed by our staff.     When to seek medical care  Call your healthcare provider right away if you have any of the following:    New or increased pain around the tube    Redness, swelling, or warmth around the incision or tube    Drainage that is foul-smelling    Vomiting    Fever of 100.4 F (38 C)    Fluid leaking around the tube    Incision seems not to be healing    Stitches become loose    Tube falls out or breaks    Drainage that changes from light pink to dark red    Blood clots in the drainage bulb    A sudden increase or decrease in the amount of drainage (over 30 mL)   Date Last Reviewed: 2/1/2017 2000-2017 [employer],  [employer]. [address] [place] [address]. All rights reserved. This information is not intended as a substitute for professional medical care. Always follow your healthcare professional's instructions.                Discharge Instructions: Caring for Your Hemovac Drainage Tube  You have been discharged with a Hemovac drainage tube. The tube was placed in your incision to remove fluid and is attached to a drain or collection device. It will help healing and reduce the risk of infection. Expect to see fluid and blood in the drain. You may also feel some burning and pulling from the stitch that holds the tube in place. Your drain will be removed when the fluid leaking from it is less than 2 tablespoons each day. There is a bandage at the site where the tube is placed. This is to protect the open area from infection. Your stitches will be taken out 7 to 14 days after surgery. Here's what you need to do to care for your Hemovac drainage tube.  General guidelines    Don t sleep on the same side as the tube.    Secure the tube and bag inside your clothing. This will prevent the tube from being pulled out.    Take a sponge bath to avoid getting your bandage and tube site wet, unless your healthcare provider tells you otherwise.    Ask your provider when can you take a shower or bathe.    Ask your provider about the best way to keep the site dry when bathing or showering.  Empty the drain  Empty your drain at least twice a day. Empty it more often if needed.    Lift the stopper. The drain will expand.    Turn the drain upside down.    Drain the fluid into a measuring cup.    Record the amount of fluid each time you empty the drain. Total the amount daily. Share this information with your doctor on your next visit.    Place the empty drain on a hard surface and press down until it is flat.    Close the cork stopper device.  Change the dressing  Change the dressing around the tube every day.    Wash your  hands.    Remove the old bandage.    Wash your hands again.    Wet a cotton swab and clean around the incision and tube site. Use normal saline solution (salt and water).    Put a new bandage on the incision and tube site. Make the bandage large enough to cover the whole incision area.    Tape the bandage in place.  Follow-up  Make a follow-up appointment as directed by our staff.     When to call your doctor  Call your doctor right away if you have any of the following:    Pain, swelling, or fluid around the tube    Redness or warmth around the incision or fluid draining from the incision    Nausea and vomiting    Fever above 100.4  F (38  C) or chills    An incision that does not heal; stitches that become infected or loose    A tube that falls out    A foul smell from the incision site    Drainage that changes from light pink to dark red    An increase in the amount of drainage after an initial decrease   Date Last Reviewed: 8/16/2015 2000-2017 [employer]. [address] [place] [address]. All rights reserved. This information is not intended as a substitute for professional medical care. Always follow your healthcare professional's instructions.

## 2018-08-09 ENCOUNTER — CARE COORDINATION (OUTPATIENT)
Dept: OTOLARYNGOLOGY | Facility: CLINIC | Age: 33
End: 2018-08-09

## 2018-08-09 NOTE — OP NOTE
Procedure Date: 08/08/2018      DATE OF PROCEDURE:  08/08/2018      SURGEON:  Orlando Viera MD      RESIDENT SURGEON:  Nancy Navarro MD      PREOPERATIVE DIAGNOSIS:  Right parotid lesion.      POSTOPERATIVE DIAGNOSIS:  Right parotid lesion.      PROCEDURE:  Excision of right superficial parotidectomy.      ANESTHESIA:  General.      COMPLICATIONS:  None.      ESTIMATED BLOOD LOSS:  15 mL.      FINDINGS:  Small scarred area of the superficial parotid gland was removed.  The large cystic lesion that was seen on imaging was present.  The facial nerve stimulator at that point reported 5 in all branches at the conclusion of the case.      INDICATIONS:  Martita is a 32-year-old female with a history of right cheek mass.  Imaging was found to have a cystic lesion in the superficial parotid.  After discussion of risks, benefits and alternatives, she was consented for the above-stated procedure.      DESCRIPTION OF PROCEDURE:  The patient was brought to the operating room, placed supine on the operating table.  General endotracheal anesthesia was induced.  The patient was turned 90 degrees.  Facial nerve electrodes were placed into the area of the frontalis, orbicularis oculi, orbicularis oris and the marginal mandibular area.  A tap test was performed to confirm their correct placement.  A modified Ghassan incision was then marked on the right, injected with 1% lidocaine with 1:100,000 epinephrine.  The patient was then prepped and draped in sterile fashion.  An institutional timeout was performed to correctly identify patient, procedure and site.  Incision was then made through the skin and subcutaneous soft tissue with a 15-blade scalpel to the level of the parotideomasseteric fascia.  This plane was elevated anteriorly and inferiorly.  We then identified the firm area in the mid lower parotid that might have been consistent with the inflamed mass seen on imaging.  We decided to proceed to find the main trunk of the facial  nerve.  Having the auricular cartilage and canal we carried the incision inferiorly deep.  Once we had the tragal pointer identified as well as the tympanomastoid suture, we began to dissect it to find the facial nerve.  Once it was discovered, we traced the nerve both the superior and inferior limbs until we fully dissected the nerve from around the superficial parotid and the specimen.  After the abnormal-appearing parotid tissue was removed the wound was copiously irrigated.  Hemostasis achieved with bipolar electrocautery.  Prass probe was used to stimulate the nerve at 0.45 milliamps and all branches stimulated.  A 7 mm round ALONSO drain was placed.  The wound was then closed in layers using a 3-0 Vicryl for the deep dermal layer and 5-0 and 6-0 nylon for the skin, 6-0 for the face and 5-0 for the extension down the neck.  The drain was secured with 3-0 nylon.  She was then turned back to Anesthesia, extubated without difficulty, and transferred to the PACU in stable condition.        Dr. Sanders was present for all portions of the procedure.         JULIANA SANDERS MD       As dictated by KIM AGOSTO MD            D: 2018   T: 2018   MT: TOM      Name:     ISAIAH CARRANZA   MRN:      [id number]        Account:        [id number]   :      1985           Procedure Date: 2018      Document: [id number]

## 2018-08-09 NOTE — PROGRESS NOTES
ENT Discharge Follow-Up      Responsible Attending Physician: Dr. Viera  Date of Discharge:  8/8/18  Discharge to:  Home    Current Status:  Pt is a 31 y/o female s/p Excision of right superficial parotidectomy on 8/8/18.  Patient reports operative  pain is decreasing.  Ambulating without assistance.  Pain well controlled with current meds, ample supply.  Reports incision CDI without signs of infection.  Denies redness, swelling, increased tenderness, or elevated temp.  Denies current bowel or bladder issues. ALONSO drain output still greater than 30ml in 24 hours. Patient will call writer once output is less and will return to clinic for removal.      Discharge instructions and medication use were reviewed.  RN triage/on call number given:  [phone] or after hours and w/e - [phone].  Patient verbalized understanding and agreement with current plan.    Follow up appointments/imaging/tests needed: Wednesday 8/15 with Dr. Viera.     Thelma Montoya, RN, BSN

## 2018-08-10 ENCOUNTER — ALLIED HEALTH/NURSE VISIT (OUTPATIENT)
Dept: OTOLARYNGOLOGY | Facility: CLINIC | Age: 33
End: 2018-08-10
Payer: COMMERCIAL

## 2018-08-10 DIAGNOSIS — Z48.03 CHANGE OR REMOVAL OF DRAINS: Primary | ICD-10-CM

## 2018-08-10 NOTE — PROGRESS NOTES
Is patient coming from ER or being seen in a peds clinic? No - use LOS [id number]    Martita Salazar comes into clinic today at the request of Dr. Viera Ordering Provider for Alonso drain removal.   Patient in clinic today for ALONSO drain removal. Drain output in last 24 hours was 10 ml, indicating ALONSO removal as it was less than 30 ml in 24 hours. Incision site evaluated and it was clean, dry and intact without evidence of redness, swelling or drainage. ALONSO site was cleansed, suture around tubing was removed, drain bulb was opened, and drain was removed easily. Patient educated on signs and symptoms of infection, site care and provided number to call with further questions or concerns. Patient verbalized understanding and was encouraged to call with any further questions or concerns      This service provided today was under the supervising provider of the day Dr. Nunes, who was available if needed.    Thelma Montoya, RN, BSN

## 2018-08-10 NOTE — MR AVS SNAPSHOT
After Visit Summary   8/10/2018    Martita Salazar    MRN: [id number]           Patient Information     Date Of Birth          1985        Visit Information        Provider Department      8/10/2018 1:00 PM Nurse, Jason [facility]        Today's Diagnoses     Change or removal of drains    -  1       Follow-ups after your visit        Your next 10 appointments already scheduled     Aug 15, 2018  9:30 AM CDT   (Arrive by 9:15 AM)   Return Visit with Orlando Viera MD   [facility] ([facility] [place] [facility])    [address] [place] [id number] [phone]              Who to contact     Please call your clinic at [phone] to:    Ask questions about your health    Make or cancel appointments    Discuss your medicines    Learn about your test results    Speak to your doctor            Additional Information About Your Visit        MyChart Information     reBouncest is an electronic gateway that provides easy, online access to your medical records. With [employer], you can request a clinic appointment, read your test results, renew a prescription or communicate with your care team.     To sign up for reBouncest visit the website at [email or web address]   You will be asked to enter the access code listed below, as well as some personal information. Please follow the directions to create your username and password.     Your access code is: [email or web address]  Expires: 10/16/2018  6:30 AM     Your access code will  in 90 days. If you need help or a new code, please contact your [facility] or call [phone] for assistance.        Care EveryWhere ID     This is your Care EveryWhere ID. This could be used by other organizations to access your [place] medical records  [id number]         Blood Pressure from Last 3 Encounters:   18 104/84    Weight from Last 3 Encounters:   18  71.9 kg (158 lb 8.2 oz)   07/18/18 71.2 kg (157 lb)              Today, you had the following     No orders found for display         Today's Medication Changes          These changes are accurate as of 8/10/18  1:13 PM.  If you have any questions, ask your nurse or doctor.               These medicines have changed or have updated prescriptions.        Dose/Directions    CIPRODEX otic suspension   This may have changed:  when to take this   Used for:  Infective otitis externa, right   Generic drug:  ciprofloxacin-dexamethasone        Dose:  2 drop   Place 2 drops into the right ear 2 times daily   Quantity:  1 Bottle   Refills:  1                Primary Care Provider Office Phone # Fax #    William Zaragoza [phone]       [facility] [address] [facility] [address]        Equal Access to Services     ALEXANDRIA LOWE : Laurie valienteo Solatosha, waaxda luqadaha, qaybta kaalmada adeegyada, troy richards . So [facility] [phone].    ATENCIÓN: Si habla español, tiene a vazquez disposición servicios gratuitos de asistencia lingüística. Llame al [phone].    We comply with applicable federal civil rights laws and [place] laws. We do not discriminate on the basis of race, color, national origin, age, disability, sex, sexual orientation, or gender identity.            Thank you!     Thank you for choosing [facility] E[facility]SE AND THROAT  for your care. Our goal is always to provide you with excellent care. Hearing back from our patients is one way we can continue to improve our services. Please take a few minutes to complete the written survey that you may receive in the mail after your visit with us. Thank you!             Your Updated Medication List - Protect others around you: Learn how to safely use, store and throw away your medicines at www.disposemymeds.org.          This list is accurate as of 8/10/18  1:13 PM.  Always use your most recent med list.                    Brand Name Dispense Instructions for use Diagnosis    CIPRODEX otic suspension   Generic drug:  ciprofloxacin-dexamethasone     1 Bottle    Place 2 drops into the right ear 2 times daily    Infective otitis externa, right       clotrimazole 1 % solution    LOTRIMIN     Apply topically 2 times daily        DIFLUCAN PO      Take 150 mg by mouth once        ibuprofen 200 MG tablet    ADVIL/MOTRIN     Take 400 mg by mouth every 6 hours as needed        oxyCODONE IR 5 MG tablet    ROXICODONE    20 tablet    Take 1-2 tablets (5-10 mg) by mouth every 3 hours as needed for pain or other (Moderate to Severe)    Parotid cyst       senna-docusate 8.6-50 MG per tablet    SENOKOT-S;PERICOLACE    30 tablet    Take 1-2 tablets by mouth 2 times daily Take while on oral narcotics to prevent or treat constipation.    Parotid cyst

## 2018-08-15 ENCOUNTER — OFFICE VISIT (OUTPATIENT)
Dept: OTOLARYNGOLOGY | Facility: CLINIC | Age: 33
End: 2018-08-15
Payer: COMMERCIAL

## 2018-08-15 VITALS — BODY MASS INDEX: 26.33 KG/M2 | HEIGHT: 65 IN | WEIGHT: 158 LBS

## 2018-08-15 DIAGNOSIS — K11.6 PAROTID CYST: Primary | ICD-10-CM

## 2018-08-15 ASSESSMENT — PAIN SCALES - GENERAL: PAINLEVEL: NO PAIN (0)

## 2018-08-15 NOTE — MR AVS SNAPSHOT
"              After Visit Summary   8/15/2018    Martita Salazar    MRN: [id number]           Patient Information     Date Of Birth          1985        Visit Information        Provider Department      8/15/2018 9:30 AM Orlando Viera MD [facility]        Today's Diagnoses     Parotid cyst    -  1       Follow-ups after your visit        Follow-up notes from your care team     Return if symptoms worsen or fail to improve, for call with path results.      Who to contact     Please call your clinic at [phone] to:    Ask questions about your health    Make or cancel appointments    Discuss your medicines    Learn about your test results    Speak to your doctor            Additional Information About Your Visit        MyChart Information     Spotwishhart is an electronic gateway that provides easy, online access to your medical records. With [employer], you can request a clinic appointment, read your test results, renew a prescription or communicate with your care team.     To sign up for Swap[employer]itt visit the website at [email or web address]   You will be asked to enter the access code listed below, as well as some personal information. Please follow the directions to create your username and password.     Your access code is: [email or web address]  Expires: 10/16/2018  6:30 AM     Your access code will  in 90 days. If you need help or a new code, please contact your [facility] or call [phone] for assistance.        Care EveryWhere ID     This is your Care EveryWhere ID. This could be used by other organizations to access your [place] medical records  [id number]        Your Vitals Were     Height BMI (Body Mass Index)                1.651 m (5' 5\") 26.29 kg/m2           Blood Pressure from Last 3 Encounters:   18 104/84    Weight from Last 3 Encounters:   08/15/18 71.7 kg (158 lb)   18 71.9 kg (158 lb 8.2 oz)   18 71.2 kg (157 lb)    "           Today, you had the following     No orders found for display         Today's Medication Changes          These changes are accurate as of 8/15/18 11:59 PM.  If you have any questions, ask your nurse or doctor.               These medicines have changed or have updated prescriptions.        Dose/Directions    CIPRODEX otic suspension   This may have changed:  when to take this   Used for:  Infective otitis externa, right   Generic drug:  ciprofloxacin-dexamethasone        Dose:  2 drop   Place 2 drops into the right ear 2 times daily   Quantity:  1 Bottle   Refills:  1                Primary Care Provider Office Phone # Fax #    William Zaragoza [phone]       [facility] [address]  [facility] [address]        Equal Access to Services     JOIE LOWE : Laurie Nicholson, wafco waldrop, qaybta kaalmada shamar, troy wood. So [facility] [phone].    ATENCIÓN: Si habla español, tiene a vazquez disposición servicios gratuitos de asistencia lingüística. Llame al [phone].    We comply with applicable federal civil rights laws and [place] laws. We do not discriminate on the basis of race, color, national origin, age, disability, sex, sexual orientation, or gender identity.            Thank you!     Thank you for choosing [facility] EAR NOSE AND THROAT  for your care. Our goal is always to provide you with excellent care. Hearing back from our patients is one way we can continue to improve our services. Please take a few minutes to complete the written survey that you may receive in the mail after your visit with us. Thank you!             Your Updated Medication List - Protect others around you: Learn how to safely use, store and throw away your medicines at www.disposemymeds.org.          This list is accurate as of 8/15/18 11:59 PM.  Always use your most recent med list.                   Brand Name Dispense Instructions for use Diagnosis     CIPRODEX otic suspension   Generic drug:  ciprofloxacin-dexamethasone     1 Bottle    Place 2 drops into the right ear 2 times daily    Infective otitis externa, right       clotrimazole 1 % solution    LOTRIMIN     Apply topically 2 times daily        DIFLUCAN PO      Take 150 mg by mouth once        ibuprofen 200 MG tablet    ADVIL/MOTRIN     Take 400 mg by mouth every 6 hours as needed        oxyCODONE IR 5 MG tablet    ROXICODONE    20 tablet    Take 1-2 tablets (5-10 mg) by mouth every 3 hours as needed for pain or other (Moderate to Severe)    Parotid cyst       senna-docusate 8.6-50 MG per tablet    SENOKOT-S;PERICOLACE    30 tablet    Take 1-2 tablets by mouth 2 times daily Take while on oral narcotics to prevent or treat constipation.    Parotid cyst

## 2018-08-15 NOTE — LETTER
8/15/2018       RE: Martita Salazar  [address] [facility] [address]     Dear Colleague,    Thank you for referring your patient, Martita Salazar, to the [facility] EAR NOSE AND THROAT at [facility]. Please see a copy of my visit note below.    HISTORY OF PRESENT ILLNESS:  Martita Salazar returns to clinic status post right superficial parotidectomy with monitoring of the facial nerve.  She had what appeared to be a benign mass easily removed, with full function of the facial nerve retained.  She is here today for postoperative evaluation and suture removal.  She notes some mild discomfort.  This is a rapidly resolving but no obvious infection or cellulitis to the site.      PHYSICAL EXAMINATION:  Her examination is consistent with that.  Sutures were removed.      Interestingly enough, examination of her external auditory canals just shows mild hyphae within, for which she was placed on antifungal drops preoperatively.      ASSESSMENT:  Stable, status post superficial parotidectomy on the right with nerve preservation. Full pathological results are pending.      PLAN:  She will continue the antifungal drops to the ear.  She will place Aquaphor to the incision site.  She will follow up with me as needed.  She can resume all normal activity including washing hair, face and exercise.      DDH/ms         Again, thank you for allowing me to participate in the care of your patient.      Sincerely,    Orlando Viera MD

## 2018-08-27 LAB — COPATH REPORT: NORMAL

## 2018-08-28 ENCOUNTER — CARE COORDINATION (OUTPATIENT)
Dept: OTOLARYNGOLOGY | Facility: CLINIC | Age: 33
End: 2018-08-28

## 2018-08-28 NOTE — PROGRESS NOTES
Called and left message for patient with pathology results:    SPECIMEN(S):   Parotid gland, right superficial     FINAL DIAGNOSIS:   PAROTID GLAND, RIGHT SUPERFICIAL, EXCISION:   - Salivary gland with area of mixed lymphocytic, histiocytic and   granulomatous inflammation, negative for   malignancy.   - One benign intraparenchymal lymph node.     Patient was encouraged to return call with any further questions or concerns.     Thelma Montoya, RN, BSN

## 2019-03-14 ENCOUNTER — PATIENT OUTREACH (OUTPATIENT)
Dept: OTOLARYNGOLOGY | Facility: CLINIC | Age: 34
End: 2019-03-14

## 2019-03-14 NOTE — PROGRESS NOTES
Received a call from patient indicating that she is having some new swelling on the left side of her face that has occurred over the last several weeks. She has seen her dentist and local ENT provider who contribute this to TMJ but have advised her to see Dr. Viera for follow-up as he did a parotid surgery on the right side last year. Patient scheduled to see Dr. Viera on 3/20. Patient agreeable to plan and was encouraged to call with further questions or concerns.    Patient does note that she is currently pregnant.     Thelma Montoya, RN, BSN

## 2019-03-20 ENCOUNTER — OFFICE VISIT (OUTPATIENT)
Dept: OTOLARYNGOLOGY | Facility: CLINIC | Age: 34
End: 2019-03-20
Payer: COMMERCIAL

## 2019-03-20 VITALS — BODY MASS INDEX: 26.82 KG/M2 | HEIGHT: 65 IN | WEIGHT: 161 LBS

## 2019-03-20 DIAGNOSIS — R22.1 NECK MASS: Primary | ICD-10-CM

## 2019-03-20 ASSESSMENT — PAIN SCALES - GENERAL: PAINLEVEL: NO PAIN (0)

## 2019-03-20 ASSESSMENT — MIFFLIN-ST. JEOR: SCORE: 1431.17

## 2019-03-20 NOTE — PATIENT INSTRUCTIONS
1. You were seen in the ENT clinic today with Dr. Viera    2. Follow up in clinic as needed.        Please call our clinic for any questions, concerns, and/or worsening symptoms.      Clinic #[phone]       Option 1 for scheduling.    Thank you for allowing us to be apart of your care!    Karla BUCK RNCC    If you need to reach me my direct line is: [phone]

## 2019-03-20 NOTE — PROGRESS NOTES
Martita Salazar returns to clinic having seen her dentist, who noticed there was enlargement of her now left-sided parotid gland.      Martita had undergone a partial parotidectomy for what was determined to be a lymphocytic enlargement of the right parotid gland on pathology.  There was no malignancy seen, so she is wondering if indeed this is the same type of lesion.      She denies dry mouth.  She is eating normal food.  She has some mild TMJ symptoms, for which her dentist is treating her.  Other than that, she has no fever, chills or shortness of breath.  There is no dysphagia or odynophagia.      PAST MEDICAL HISTORY:  Reviewed.      MEDICATIONS:  Reviewed.      ALLERGIES:  Reviewed.      REVIEW OF SYSTEMS:  Significant for the above.      PHYSICAL EXAMINATION:  Examination shows mild asymmetry to the craniofacial skeleton.  Essentially, she is status post atrophy of the right face, status post superficial parotidectomy, but there is some fullness.  As the family relates, there is some fullness occasionally toward the mid afternoon.  There is no erythema involved.  There is no tenderness on palpation.  There is no nodularity felt.  The parotid gland makes normal saliva on palpation.  The remainder of the head and neck examination is unremarkable.      ASSESSMENT:  Martita is now pregnant, and we will forego any testing at this point.  I feel that it is mainly due to a possible mass or hypertrophy instead of the parotid gland.      PLAN:  In light of that, we will monitor for the time being and see her back after delivery.

## 2019-03-20 NOTE — NURSING NOTE
Chief Complaint   Patient presents with     RECHECK     new swelling left side of face      Troy Jara, EMT

## 2019-03-20 NOTE — LETTER
3/20/2019       RE: Martita Salazar  [address] [facility] [address]     Dear Colleague,    Thank you for referring your patient, Martita Salazar, to the [facility] E[facility]SE [facility] TH[facility] at [facility]. Please see a copy of my visit note below.    Martita Salazar returns to clinic having seen her dentist, who noticed there was enlargement of her now left-sided parotid gland.      Martita had undergone a partial parotidectomy for what was determined to be a lymphocytic enlargement of the right parotid gland on pathology.  There was no malignancy seen, so she is wondering if indeed this is the same type of lesion.      She denies dry mouth.  She is eating normal food.  She has some mild TMJ symptoms, for which her dentist is treating her.  Other than that, she has no fever, chills or shortness of breath.  There is no dysphagia or odynophagia.      PAST MEDICAL HISTORY:  Reviewed.      MEDICATIONS:  Reviewed.      ALLERGIES:  Reviewed.      REVIEW OF SYSTEMS:  Significant for the above.      PHYSICAL EXAMINATION:  Examination shows mild asymmetry to the craniofacial skeleton.  Essentially, she is status post atrophy of the right face, status post superficial parotidectomy, but there is some fullness.  As the family relates, there is some fullness occasionally toward the mid afternoon.  There is no erythema involved.  There is no tenderness on palpation.  There is no nodularity felt.  The parotid gland makes normal saliva on palpation.  The remainder of the head and neck examination is unremarkable.      ASSESSMENT:  Martita is now pregnant, and we will forego any testing at this point.  I feel that it is mainly due to a possible mass or hypertrophy instead of the parotid gland.      PLAN:  In light of that, we will monitor for the time being and see her back after delivery.         Again, thank you for allowing me to participate in the care of your patient.      Sincerely,    Orlando Hayes  MD Maximiliano

## 2019-04-29 NOTE — PROGRESS NOTES
HISTORY OF PRESENT ILLNESS:  Martita Salazar is a 32-year-old female who presents with right otalgia, otorrhea, and a right facial mass.  She feels it is slowly growing and has given her pain, not subjective of trigeminal neuralgia, but a more intense pain with a longer lasting effect.  She has no dysphagia, odynophagia, shortness of breath, fever, chills.  She has had no facial nerve weakness.      PAST MEDICAL HISTORY, MEDICATIONS, ALLERGIES:  Reviewed.  She has had no prior surgeries to the head and neck area.      REVIEW OF SYSTEMS:  Significant for the above.  She has no subjective hearing loss at this time.  In fact, the ear is dry on today's visit.  She has no TMJ history as well.      PHYSICAL EXAMINATION:  Examination shows mild fullness to the right tail of parotid area.  It is nontender on palpation.  There is no overlying erythema.  There is no ipsilateral neck adenopathy, and there is no thyromegaly.  The remainder of the head and neck examination is essentially unremarkable.  She is nontender on palpation to the TMJ itself.  The ear canal is dry.  The TM is intact.  There is no air fluid level.      IMAGING:  The CT scan from an outside source shows there is a 2.3 x 2.2 x 1.0 cm mass within the superficial lobe of the right parotid gland.  There is an ipsilateral node that is 1.1 cm in size at level IIA.  There is no other adenopathy.      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      PLAN:  I recommend excision of the lesion with facial nerve monitoring.  If indeed this shows to be positive on frozen section, we may extend to a neck dissection on the same side.  We will schedule this in short order.     See dictated note for Right Parotid mass necessitating resection with facial nerve monitoring.  Orlando Viera MD, DDS  
all other ROS negative except as per HPI

## 2019-09-06 ENCOUNTER — TELEPHONE (OUTPATIENT)
Dept: OTOLARYNGOLOGY | Facility: CLINIC | Age: 34
End: 2019-09-06

## 2019-09-06 NOTE — TELEPHONE ENCOUNTER
Patient called stating that when she chews something crunchy that her cheek gets wet on the outside. Discussed with patient this does happen and that a botox injection can keep this from happening. Patient not sure what she wants to do, will call back when she decides.

## 2022-10-31 ENCOUNTER — MEDICAL CORRESPONDENCE (OUTPATIENT)
Dept: HEALTH INFORMATION MANAGEMENT | Facility: CLINIC | Age: 37
End: 2022-10-31

## 2022-11-03 ENCOUNTER — MEDICAL CORRESPONDENCE (OUTPATIENT)
Dept: HEALTH INFORMATION MANAGEMENT | Facility: CLINIC | Age: 37
End: 2022-11-03

## 2022-11-04 ENCOUNTER — TRANSCRIBE ORDERS (OUTPATIENT)
Dept: OTHER | Age: 37
End: 2022-11-04

## 2022-11-04 DIAGNOSIS — E23.6 PITUITARY CYST (H): Primary | ICD-10-CM

## 2022-11-07 DIAGNOSIS — E23.6 PITUITARY CYST (H): Primary | ICD-10-CM

## 2022-11-09 NOTE — TELEPHONE ENCOUNTER
Action 11/9/22 MV 10.46am   Action Taken Imaging request faxed to [facility] + [facility]    11/11/22 MV 2.43pm  Images resolved in PACS         RECORDS RECEIVED FROM: external   REASON FOR VISIT: Pituitary cyst   Date of Appt: 11/16/22   NOTES (FOR ALL VISITS) STATUS DETAILS   OFFICE NOTE from referring provider Care Everywhere Dr Nhung Brunson @ [facility]:  10/28/22  9/12/22   MEDICATION LIST Care Everywhere    IMAGING  (FOR ALL VISITS)     MRI (HEAD, NECK, SPINE) Received [facility]:  MRI Brain 11/7/22  MRI Head 10/18/22   CT (HEAD, NECK, SPINE) Received  [facility]:  CT Maxface 6/29/18  CT Facial Bones 6/29/18

## 2022-11-15 NOTE — PROGRESS NOTES
[facility]      Name: Martita Salazar  : 1985  Referring provider: Dr. Zaragoza  2022      Reason for visit: pituitary cyst    Dear Dr. Zaragoza,     It was a pleasure to see Mrs. Salazar in the [facility] today as a new patient.  As you recall, Mrs. Salazar is a right handed 37 year old female who has a history of neck and ocular issues. She initially noticed symptoms of visual changes when she was pregnant with her daughter about 5 years ago. She describes constant visual changes bilaterally with blurry vision in her peripheral vision. After having her daughter her symptoms improved but they never fully resolved. Since then she has had 3 miscarriages and had her son about 2 years ago. She stopped breast feeding about a year ago but had a prolonged sensation that she needed to pump which she felt was abnormal. She started to have numbness on the left side of her mouth and tongue which lasted about a week but then resolved and have not recurred. This prompted her to obtain MRI imaging which demonstrated findings consistent with pituitary cyst. She has also had increased frequency in headaches over the past couple of months in her frontal region but has never had a migraine. These are not necessarily associated with her vision changes as she has constant blurry vision. She also had neck pain so was sent to physical therapy for management of this as well as her blurred vision which seemed to help somewhat but her visual changes are still present. She also mentions that she feels disoriented while pushing a cart or stroller forward. She denies double vision. She has never consulted with endocrinology. She also mentions other symptoms including rhinorrhea, jaw pain, and heightened anxiety recently.     Review of Systems:   Pertinent items are noted in HPI or as in patient entered ROS below, remainder of complete ROS is negative.      Active  Medications: oxycodone, sennakot s, dulcolax, tylenol, correctol      Allergies: azithromycin      Past Medical History: pituitary cyst, cervicalgia, term pregnancy, recurrent pregnancy loss, shoulder dystocia in pregnancy, low lying placenta antepartum, incomplete miscarriage, acute suppurative parotitis, blood loss anemia, choroid plexus cyst, Qegw-Teox-Pjkdjs syndrome, low serum progesterone     Past Surgical History: ectopic pregnancy removal from fallopian tube, parotid biopsy, reduction of hemorrhoid, dilation and curettage, hysteroscopy, salpingectomy and/or oophorectomy     Family History: COPD, diabetes, heart disease     Social History: She lives [place] west from the [place]. 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 They have two children, ages 5 and 2. They are not planning on having more children. She is a nonsmoker.      Physical Exam:   General: No acute distress.   Head: No signs of trauma.    Eyes: Conjunctivae are normal.   Mouth/Throat: Oropharynx moist.  Neck: Normal range of motion.    Resp: No respiratory distress.   MSK: Moves all extremities.  No obvious deformity.  Neuro: The patient is fully oriented and quite pleasant. Speech normal. 20/20 bilaterally uncorrected. Full visual fields. Extraocular movements are intact without nystagmus. Facial sensation is intact in V1, V2, V3 distributions. Facial nerve function is normal, rated as a House Brackmann I/VI, without synkinesis.  Hearing is normal. Palate is symmetric. Shoulders are full strength. Tongue is midline. There is no pronator drift. Full strength in all extremities. Sensation intact throughout.   Psych: Normal mood and affect. Behavior is normal.      Imaging:  We reviewed her recent MRI dated 11/8/22.  This demonstrates  IMPRESSION:   1.  Negative for acute intracranial process.   2.  Cystlike enlargement of the adenohypophysis. Rathke's pouch cyst is favored. Atypical cystic pituitary macroadenoma is a  "less likely consideration.   3.  Prominent Meckel's cave bilaterally is nonspecific but may be seen in cases of idiopathic intracranial hypertension.   4.  Subtle low signal changes throughout the calvarial marrow spaces is nonspecific. In a patient of this young age this may represent normal anatomical \"red marrow \"variation.    Laboratory   11/16/22  TSH: 1.19 (WNL)   T4: 1.26 (WNL)  Na 140  Prolactin 15  LH 4.9  FSH 5.3  Cortisol 12.9      Assessment:  Pituitary cyst, stable to slightly increased compared to CT from 2018    Plan:  1. I reviewed the results of her MRI and CT imaging which does show signs of a pituitary cyst and we had a discussion about the likely diagnosis of rathke's cyst versus other types of pituitary lesions. In comparison of her imaging to 2018, there is a stable or slightly increased size, but no major changes.   2. I discussed possible treatment options including observation, surgery, and medical management depending on her lab results which she obtained this morning. Surgery would be indicated if she was experiencing vision changes or other hormone related symptoms but in the absence of symptoms, I would like to perform a broader workup before determining if surgery is needed.  3. Referral to neuro ophthalmology   4. Refferral to endocrinology   5. As a backup plan, we will arrange a followup MRI in 1 year. If there are concerns from ophthalmology or endocrinology, we can followup sooner.  6. I encouraged her to contact us with any questions or concerns that arise in advance of our next appointment.     It has been a pleasure to participate in the care of your patient. Please feel free to contact us if we may be of any assistance for Mrs. Salazar.      Jerome Woodward MD   Department of Neurosurgery  [facility]  [facility]         60 minutes spent on the date of the encounter doing chart review, review of outside records, review of test " results, interpretation of tests, patient visit, documentation and discussion with other provider(s)      Scribe Disclosure:  I, Salina Riley, am serving as a scribe to document services personally performed by Jerome Woodward MD at this visit, based upon the provider's statements to me. All documentation has been reviewed by the aforementioned provider prior to being entered into the official medical record.

## 2022-11-16 ENCOUNTER — LAB (OUTPATIENT)
Dept: LAB | Facility: CLINIC | Age: 37
End: 2022-11-16
Payer: COMMERCIAL

## 2022-11-16 ENCOUNTER — OFFICE VISIT (OUTPATIENT)
Dept: NEUROSURGERY | Facility: CLINIC | Age: 37
End: 2022-11-16
Payer: COMMERCIAL

## 2022-11-16 ENCOUNTER — PRE VISIT (OUTPATIENT)
Dept: NEUROSURGERY | Facility: CLINIC | Age: 37
End: 2022-11-16

## 2022-11-16 VITALS — SYSTOLIC BLOOD PRESSURE: 112 MMHG | HEART RATE: 73 BPM | OXYGEN SATURATION: 100 % | DIASTOLIC BLOOD PRESSURE: 71 MMHG

## 2022-11-16 DIAGNOSIS — E23.6 PITUITARY CYST (H): Primary | ICD-10-CM

## 2022-11-16 DIAGNOSIS — E23.6 PITUITARY CYST (H): ICD-10-CM

## 2022-11-16 LAB
ANION GAP SERPL CALCULATED.3IONS-SCNC: 9 MMOL/L (ref 7–15)
BUN SERPL-MCNC: 11.8 MG/DL (ref 6–20)
CALCIUM SERPL-MCNC: 8.9 MG/DL (ref 8.6–10)
CHLORIDE SERPL-SCNC: 106 MMOL/L (ref 98–107)
CORTIS SERPL-MCNC: 12.9 UG/DL
CREAT SERPL-MCNC: 0.65 MG/DL (ref 0.51–0.95)
DEPRECATED HCO3 PLAS-SCNC: 25 MMOL/L (ref 22–29)
FSH SERPL IRP2-ACNC: 5.3 MIU/ML
GFR SERPL CREATININE-BSD FRML MDRD: >90 ML/MIN/1.73M2
GLUCOSE SERPL-MCNC: 98 MG/DL (ref 70–99)
LH SERPL-ACNC: 4.9 MIU/ML
POTASSIUM SERPL-SCNC: 4.3 MMOL/L (ref 3.4–5.3)
PROLACTIN SERPL 3RD IS-MCNC: 15 NG/ML (ref 5–23)
SODIUM SERPL-SCNC: 140 MMOL/L (ref 136–145)
T4 FREE SERPL-MCNC: 1.26 NG/DL (ref 0.9–1.7)
TSH SERPL DL<=0.005 MIU/L-ACNC: 1.19 UIU/ML (ref 0.3–4.2)

## 2022-11-16 PROCEDURE — 99205 OFFICE O/P NEW HI 60 MIN: CPT | Performed by: NEUROLOGICAL SURGERY

## 2022-11-16 PROCEDURE — 82024 ASSAY OF ACTH: CPT | Mod: 90 | Performed by: PATHOLOGY

## 2022-11-16 PROCEDURE — 84146 ASSAY OF PROLACTIN: CPT | Mod: 90 | Performed by: PATHOLOGY

## 2022-11-16 PROCEDURE — 82533 TOTAL CORTISOL: CPT | Mod: 90 | Performed by: PATHOLOGY

## 2022-11-16 PROCEDURE — 83001 ASSAY OF GONADOTROPIN (FSH): CPT | Mod: 90 | Performed by: PATHOLOGY

## 2022-11-16 PROCEDURE — 84443 ASSAY THYROID STIM HORMONE: CPT | Performed by: PATHOLOGY

## 2022-11-16 PROCEDURE — 80048 BASIC METABOLIC PNL TOTAL CA: CPT | Performed by: PATHOLOGY

## 2022-11-16 PROCEDURE — 83003 ASSAY GROWTH HORMONE (HGH): CPT | Mod: 90 | Performed by: PATHOLOGY

## 2022-11-16 PROCEDURE — 83002 ASSAY OF GONADOTROPIN (LH): CPT | Mod: 90 | Performed by: PATHOLOGY

## 2022-11-16 PROCEDURE — 84305 ASSAY OF SOMATOMEDIN: CPT | Mod: 90 | Performed by: PATHOLOGY

## 2022-11-16 PROCEDURE — 84439 ASSAY OF FREE THYROXINE: CPT | Performed by: PATHOLOGY

## 2022-11-16 PROCEDURE — 36415 COLL VENOUS BLD VENIPUNCTURE: CPT | Performed by: PATHOLOGY

## 2022-11-16 PROCEDURE — 99000 SPECIMEN HANDLING OFFICE-LAB: CPT | Performed by: PATHOLOGY

## 2022-11-16 ASSESSMENT — PAIN SCALES - GENERAL: PAINLEVEL: NO PAIN (0)

## 2022-11-16 NOTE — Clinical Note
2022       RE: Martita Salazar  [id number] [facility] [address]     Dear Colleague,    Thank you for referring your patient, Martita Salazar, to the [facility] [place] at [facility]. Please see a copy of my visit note below.      [facility]      Name: Martita Salazar  : 1985  Referring provider: Dr. Zaragoza  2022      Reason for visit: pituitary cyst    Dear Dr. Zaragoza,     It was a pleasure to see Mrs. Salazar in the [facility] today as a new patient.  As you recall, Mrs. Salazar is a right handed 37 year old female who has a history of neck and ocular issues. She initially noticed symptoms of visual changes when she was pregnant with her daughter about 5 years ago. She describes constant visual changes bilaterally with blurry vision in her peripheral vision. After having her daughter her symptoms improved but they never fully resolved. Since then she has had 3 miscarriages and had her son about 2 years ago. She stopped breast feeding about a year ago but had a prolonged sensation that she needed to pump which she felt was abnormal. She started to have numbness on the left side of her mouth and tongue which lasted about a week but then resolved and have not recurred. This prompted her to obtain MRI imaging which demonstrated findings consistent with pituitary cyst. She has also had increased frequency in headaches over the past couple of months in her frontal region but has never had a migraine. These are not necessarily associated with her vision changes as she has constant blurry vision. She also had neck pain so was sent to physical therapy for management of this as well as her blurred vision which seemed to help somewhat but her visual changes are still present. She also mentions that she feels disoriented while pushing a cart or stroller forward. She  denies double vision. She has never consulted with endocrinology. She also mentions other symptoms including rhinorrhea, jaw pain, and heightened anxiety recently.     Review of Systems:   Pertinent items are noted in HPI or as in patient entered ROS below, remainder of complete ROS is negative.      Active Medications: oxycodone, sennakot s, dulcolax, tylenol, correctol      Allergies: azithromycin      Past Medical History: pituitary cyst, cervicalgia, term pregnancy, recurrent pregnancy loss, shoulder dystocia in pregnancy, low lying placenta antepartum, incomplete miscarriage, acute suppurative parotitis, blood loss anemia, choroid plexus cyst, Rxaf-Yeun-Czcztx syndrome, low serum progesterone     Past Surgical History: ectopic pregnancy removal from fallopian tube, parotid biopsy, reduction of hemorrhoid, dilation and curettage, hysteroscopy, salpingectomy and/or oophorectomy     Family History: COPD, diabetes, heart disease     Social History: She lives about [place] hours west from the [place]. 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 They have two children, ages 5 and 2. They are not planning on having more children. She is a nonsmoker.      Physical Exam:   General: No acute distress.   Head: No signs of trauma.    Eyes: Conjunctivae are normal.   Mouth/Throat: Oropharynx moist.  Neck: Normal range of motion.    Resp: No respiratory distress.   MSK: Moves all extremities.  No obvious deformity.  Neuro: The patient is fully oriented and quite pleasant. Speech normal. 20/20 bilaterally uncorrected. Full visual fields. Extraocular movements are intact without nystagmus. Facial sensation is intact in V1, V2, V3 distributions. Facial nerve function is normal, rated as a House Brackmann I/VI, without synkinesis.  Hearing is normal. Palate is symmetric. Shoulders are full strength. Tongue is midline. There is no pronator drift. Full strength in all extremities. Sensation intact throughout.  "  Psych: Normal mood and affect. Behavior is normal.      Imaging:  We reviewed her recent MRI dated 11/8/22.  This demonstrates  IMPRESSION:   1.  Negative for acute intracranial process.   2.  Cystlike enlargement of the adenohypophysis. Rathke's pouch cyst is favored. Atypical cystic pituitary macroadenoma is a less likely consideration.   3.  Prominent Meckel's cave bilaterally is nonspecific but may be seen in cases of idiopathic intracranial hypertension.   4.  Subtle low signal changes throughout the calvarial marrow spaces is nonspecific. In a patient of this young age this may represent normal anatomical \"red marrow \"variation.    Laboratory   11/16/22  TSH: 1.19 (WNL)   T4: 1.26 (WNL)  Na 140  Prolactin 15  LH 4.9  FSH 5.3  Cortisol 12.9      Assessment:  Pituitary cyst, stable to slightly increased compared to CT from 2018    Plan:  1. I reviewed the results of her MRI and CT imaging which does show signs of a pituitary cyst and we had a discussion about the likely diagnosis of rathke's cyst versus other types of pituitary lesions. In comparison of her imaging to 2018, there is a stable or slightly increased size, but no major changes.   2. I discussed possible treatment options including observation, surgery, and medical management depending on her lab results which she obtained this morning. Surgery would be indicated if she was experiencing vision changes or other hormone related symptoms but in the absence of symptoms, I would like to perform a broader workup before determining if surgery is needed.  3. Referral to neuro ophthalmology   4. Refferral to endocrinology   5. As a backup plan, we will arrange a followup MRI in 1 year. If there are concerns from ophthalmology or endocrinology, we can followup sooner.  6. I encouraged her to contact us with any questions or concerns that arise in advance of our next appointment.     It has been a pleasure to participate in the care of your patient. Please " feel free to contact us if we may be of any assistance for Mrs. Salazar.      Jerome Woodward MD   Department of Neurosurgery  [facility]  [facility]         60 minutes spent on the date of the encounter doing chart review, review of outside records, review of test results, interpretation of tests, patient visit, documentation and discussion with other provider(s)      Scribe Disclosure:  I, Salina Rosemary, am serving as a scribe to document services personally performed by Jerome Woodward MD at this visit, based upon the provider's statements to me. All documentation has been reviewed by the aforementioned provider prior to being entered into the official medical record.      Again, thank you for allowing me to participate in the care of your patient.      Sincerely,    Jerome Woodward MD

## 2022-11-16 NOTE — PATIENT INSTRUCTIONS
You were seen today with Dr. Jerome Woodward. Your primary contact after today's visit is: LETY Duarte    Next steps:  Referral to neuro-ophthalmology.  Referral to endocrinology.  We will plan to see you back in clinic in 1 year with MRI prior. We will call you sooner with any abnormal lab results.      How to Contact Us  Send a [employer]t message to your provider.   Call the clinic - your call will be routed appropriately.   Neurosurgery Clinic: [phone]  To speak directly to an RN Care Coordinator:  Felicia RN: [phone]  Piedad RN: [phone]    Note: We do our best to check voicemail frequently throughout the day and make every effort to return calls within 1-2 business days. For urgent matters, please use the general clinic phone numbers listed above.

## 2022-11-16 NOTE — LETTER
[place] [facility]  [facility]UROS[facility] CLINIC [address]  [address]LO[address]  [facility] [address]  Phone: [phone]  Fax: [phone]          2022    RE:   Martita Salazar  [id number] [facility] [address]      Dear Colleague,    Thank you for referring your patient, Martita Salazar, to the [facility]. Please see a copy of my visit note below.        [facility]      Name: Martita Salazar  : 1985  Referring provider: Dr. Zaragoza  2022      Reason for visit: pituitary cyst    Dear Dr. Zaragoza,     It was a pleasure to see Mrs. Salazar in the [facility] today as a new patient.  As you recall, Mrs. Salazar is a right handed 37 year old female who has a history of neck and ocular issues. She initially noticed symptoms of visual changes when she was pregnant with her daughter about 5 years ago. She describes constant visual changes bilaterally with blurry vision in her peripheral vision. After having her daughter her symptoms improved but they never fully resolved. Since then she has had 3 miscarriages and had her son about 2 years ago. She stopped breast feeding about a year ago but had a prolonged sensation that she needed to pump which she felt was abnormal. She started to have numbness on the left side of her mouth and tongue which lasted about a week but then resolved and have not recurred. This prompted her to obtain MRI imaging which demonstrated findings consistent with pituitary cyst. She has also had increased frequency in headaches over the past couple of months in her frontal region but has never had a migraine. These are not necessarily associated with her vision changes as she has constant blurry vision. She also had neck pain so was sent to physical therapy for management of this as well as her blurred vision which seemed to  help somewhat but her visual changes are still present. She also mentions that she feels disoriented while pushing a cart or stroller forward. She denies double vision. She has never consulted with endocrinology. She also mentions other symptoms including rhinorrhea, jaw pain, and heightened anxiety recently.     Review of Systems:   Pertinent items are noted in HPI or as in patient entered ROS below, remainder of complete ROS is negative.      Active Medications: oxycodone, sennakot s, dulcolax, tylenol, correctol      Allergies: azithromycin      Past Medical History: pituitary cyst, cervicalgia, term pregnancy, recurrent pregnancy loss, shoulder dystocia in pregnancy, low lying placenta antepartum, incomplete miscarriage, acute suppurative parotitis, blood loss anemia, choroid plexus cyst, Wenc-Aeoh-Taxwwj syndrome, low serum progesterone     Past Surgical History: ectopic pregnancy removal from fallopian tube, parotid biopsy, reduction of hemorrhoid, dilation and curettage, hysteroscopy, salpingectomy and/or oophorectomy     Family History: COPD, diabetes, heart disease     Social History: She lives about [place] hours west from the [place]. 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 They have two children, ages 5 and 2. They are not planning on having more children. She is a nonsmoker.      Physical Exam:   General: No acute distress.   Head: No signs of trauma.    Eyes: Conjunctivae are normal.   Mouth/Throat: Oropharynx moist.  Neck: Normal range of motion.    Resp: No respiratory distress.   MSK: Moves all extremities.  No obvious deformity.  Neuro: The patient is fully oriented and quite pleasant. Speech normal. 20/20 bilaterally uncorrected. Full visual fields. Extraocular movements are intact without nystagmus. Facial sensation is intact in V1, V2, V3 distributions. Facial nerve function is normal, rated as a House Brackmann I/VI, without synkinesis.  Hearing is normal. Palate is symmetric.  "Shoulders are full strength. Tongue is midline. There is no pronator drift. Full strength in all extremities. Sensation intact throughout.   Psych: Normal mood and affect. Behavior is normal.      Imaging:  We reviewed her recent MRI dated 11/8/22.  This demonstrates  IMPRESSION:   1.  Negative for acute intracranial process.   2.  Cystlike enlargement of the adenohypophysis. Rathke's pouch cyst is favored. Atypical cystic pituitary macroadenoma is a less likely consideration.   3.  Prominent Meckel's cave bilaterally is nonspecific but may be seen in cases of idiopathic intracranial hypertension.   4.  Subtle low signal changes throughout the calvarial marrow spaces is nonspecific. In a patient of this young age this may represent normal anatomical \"red marrow \"variation.    Laboratory   11/16/22  TSH: 1.19 (WNL)   T4: 1.26 (WNL)  Na 140  Prolactin 15  LH 4.9  FSH 5.3  Cortisol 12.9      Assessment:  Pituitary cyst, stable to slightly increased compared to CT from 2018    Plan:  1. I reviewed the results of her MRI and CT imaging which does show signs of a pituitary cyst and we had a discussion about the likely diagnosis of rathke's cyst versus other types of pituitary lesions. In comparison of her imaging to 2018, there is a stable or slightly increased size, but no major changes.   2. I discussed possible treatment options including observation, surgery, and medical management depending on her lab results which she obtained this morning. Surgery would be indicated if she was experiencing vision changes or other hormone related symptoms but in the absence of symptoms, I would like to perform a broader workup before determining if surgery is needed.  3. Referral to neuro ophthalmology   4. Refferral to endocrinology   5. As a backup plan, we will arrange a followup MRI in 1 year. If there are concerns from ophthalmology or endocrinology, we can followup sooner.  6. I encouraged her to contact us with any questions " or concerns that arise in advance of our next appointment.     It has been a pleasure to participate in the care of your patient. Please feel free to contact us if we may be of any assistance for Mrs. Salazar.      Jerome Woodward MD   Department of Neurosurgery  [facility]  [facility]         60 minutes spent on the date of the encounter doing chart review, review of outside records, review of test results, interpretation of tests, patient visit, documentation and discussion with other provider(s)      Scribe Disclosure:  I, Salina Riley, am serving as a scribe to document services personally performed by Jerome Woodward MD at this visit, based upon the provider's statements to me. All documentation has been reviewed by the aforementioned provider prior to being entered into the official medical record.

## 2022-11-17 LAB
ACTH PLAS-MCNC: 19 PG/ML
GH SERPL-MCNC: 1.9 UG/L

## 2022-11-18 LAB — IGF-I BLD-MCNC: 54 NG/ML (ref 79–276)

## 2022-12-07 ENCOUNTER — VIRTUAL VISIT (OUTPATIENT)
Dept: ENDOCRINOLOGY | Facility: CLINIC | Age: 37
End: 2022-12-07
Attending: NEUROLOGICAL SURGERY
Payer: COMMERCIAL

## 2022-12-07 DIAGNOSIS — E23.0 GROWTH HORMONE DEFICIENCY (H): ICD-10-CM

## 2022-12-07 DIAGNOSIS — E23.6 PITUITARY CYST (H): Primary | ICD-10-CM

## 2022-12-07 PROCEDURE — 99205 OFFICE O/P NEW HI 60 MIN: CPT | Mod: 95 | Performed by: INTERNAL MEDICINE

## 2022-12-07 NOTE — PROGRESS NOTES
Martita Salazar is being evaluated via a billable video visit.        How would you like to obtain your AVS? Telsima  For the video visit, send the invitation by: Send to e-mail at: [email or web address]  Will anyone else be joining your video visit? No      Video visit  Start 4:45 pm  End 5:18 pm  [facility]                                                                               - Endocrinology Initial Consultation -    Reason for visit/consult:  Pituitary cyst (H)    Primary care provider: William Zaragoza    HPI: A 38 yo female here for the endocrine evaluation for her pituitary cystic lesion.  Her pituitary cystic lesion was incidentally found in October 2022 when she had numbness on the left side of her tongue she underwent to MRI and found out cystic lesion.  She was seen by  and pituitary function test was done which showed TSH 1.19, free T4 1.26 normal thyroid function, prolactin 15, a.m. cortisol 12.9 her IGF-I was low 54.   Currently she mentioned occasional light sensitivity, increased sensitivity for the sense of smell, occasional nausea, she still has some feeling of leaking of the breastmilk.  She had childbirth 2017 and 2020 and currently not breast-feeding for the past 1 year however she still have some sensation.   Her menarche age 10 and has been regular menstrual cycle however after the second birth of the child her menstrual cycle has been irregular range from 21 to 31 days.  No other significant medical history noted.  Family history including diabetes in her family.     Past Medical/Surgical History:  Past Medical History:   Diagnosis Date     Allergic rhinitis      Past Surgical History:   Procedure Laterality Date     GYN SURGERY       PAROTIDECTOMY Right 8/8/2018    Procedure: PAROTIDECTOMY;  Right Parotidectomy;  Surgeon: Orlando Viera MD;  Location:  OR       Allergies:  Allergies   Allergen Reactions     Azithromycin Nausea and Vomiting       Current Medications    Current Outpatient Medications   Medication     CIPRODEX otic suspension     clotrimazole (LOTRIMIN) 1 % solution     ibuprofen (ADVIL/MOTRIN) 200 MG tablet     oxyCODONE IR (ROXICODONE) 5 MG tablet     senna-docusate (SENOKOT-S;PERICOLACE) 8.6-50 MG per tablet     No current facility-administered medications for this visit.       Family History:  No family history on file.    Social History:  Social History     Tobacco Use     Smoking status: Never     Smokeless tobacco: Never   Substance Use Topics     Alcohol use: Yes       ROS:  Full review of systems taken with the help of the intake sheet. Otherwise a complete 14 point review of systems was taken and is negative unless stated in the history above.      Physical Exam:   Vitals: There were no vitals taken for this visit.  BMI= There is no height or weight on file to calculate BMI.   General: well appearing, no acute distress, pleasant and conversant,   Mental Status/neuro: alert and oriented  Face: symmetrical, normal facial color  Eyes: anicteric,  no proptosis or lid lag  Resp: normally breathing      Labs : I reviewed data from epic and extract and summarize the pertinent data here.   Lab Results   Component Value Date     11/16/2022      Lab Results   Component Value Date    POTASSIUM 4.3 11/16/2022     Lab Results   Component Value Date    CHLORIDE 106 11/16/2022     Lab Results   Component Value Date    SIERRA 8.9 11/16/2022     Lab Results   Component Value Date    CO2 25 11/16/2022     Lab Results   Component Value Date    BUN 11.8 11/16/2022     Lab Results   Component Value Date    CR 0.65 11/16/2022     Lab Results   Component Value Date    GLC 98 11/16/2022     Lab Results   Component Value Date    TSH 1.19 11/16/2022     Lab Results   Component Value Date    T4 1.26 11/16/2022     No results found for: A1C    No results found for: IGF1  No results found for: LH  Lab Results   Component Value Date    FSH 5.3 11/16/2022     No results found for:  ESTROGEN  No results found for: PROLACTIN        MRI Brain: I personally reviewed the original images and agree with the below reports.             Assessment and Plan  37 year old female with pituitary cystic lesion    At this point, there is GH deficiency but otherwise all normal range, however her irregular menses may suggest that she may have some other subtle signs of hypopituitarism.     Breast discharge  PRL is normal range at this point    Pituitary cystic lesion  Agree with non-urgent case and evaluated by neuro-ophthalmologist and follow up MRI  Also watching any symptoms of hypopituitarism       RTC with me in 6 month      Total 60 minutes spent on the date of the encounter doing chart review, history and exam, documentation and further activities as noted above.        Katelyn Crawford MD  Staff Physician  Endocrinology and Metabolism  License: [id number]

## 2022-12-07 NOTE — LETTER
12/7/2022       RE: Martita Salazar  [address]  [facility] [address]     Dear Colleague,    Thank you for referring your patient, Martita Salazar, to the [facility] [place] [facility]. Please see a copy of my visit note below.    Martita Salazar is being evaluated via a billable video visit.        How would you like to obtain your AVS? MyChart  For the video visit, send the invitation by: Send to e-mail at: [email or web address]  Will anyone else be joining your video visit? No      Video visit  Start 4:45 pm  End 5:18 pm  [facility]                                                                               - Endocrinology Initial Consultation -    Reason for visit/consult:  Pituitary cyst (H)    Primary care provider: William Zaragoza    HPI: A 36 yo female here for the endocrine evaluation for her pituitary cystic lesion.  Her pituitary cystic lesion was incidentally found in October 2022 when she had numbness on the left side of her tongue she underwent to MRI and found out cystic lesion.  She was seen by  and pituitary function test was done which showed TSH 1.19, free T4 1.26 normal thyroid function, prolactin 15, a.m. cortisol 12.9 her IGF-I was low 54.   Currently she mentioned occasional light sensitivity, increased sensitivity for the sense of smell, occasional nausea, she still has some feeling of leaking of the breastmilk.  She had childbirth 2017 and 2020 and currently not breast-feeding for the past 1 year however she still have some sensation.   Her menarche age 10 and has been regular menstrual cycle however after the second birth of the child her menstrual cycle has been irregular range from 21 to 31 days.  No other significant medical history noted.  Family history including diabetes in her family.     Past Medical/Surgical History:  Past Medical History:   Diagnosis Date     Allergic rhinitis      Past  Surgical History:   Procedure Laterality Date     GYN SURGERY       PAROTIDECTOMY Right 8/8/2018    Procedure: PAROTIDECTOMY;  Right Parotidectomy;  Surgeon: Orlando Viera MD;  Location:  OR       Allergies:  Allergies   Allergen Reactions     Azithromycin Nausea and Vomiting       Current Medications   Current Outpatient Medications   Medication     CIPRODEX otic suspension     clotrimazole (LOTRIMIN) 1 % solution     ibuprofen (ADVIL/MOTRIN) 200 MG tablet     oxyCODONE IR (ROXICODONE) 5 MG tablet     senna-docusate (SENOKOT-S;PERICOLACE) 8.6-50 MG per tablet     No current facility-administered medications for this visit.       Family History:  No family history on file.    Social History:  Social History     Tobacco Use     Smoking status: Never     Smokeless tobacco: Never   Substance Use Topics     Alcohol use: Yes       ROS:  Full review of systems taken with the help of the intake sheet. Otherwise a complete 14 point review of systems was taken and is negative unless stated in the history above.      Physical Exam:   Vitals: There were no vitals taken for this visit.  BMI= There is no height or weight on file to calculate BMI.   General: well appearing, no acute distress, pleasant and conversant,   Mental Status/neuro: alert and oriented  Face: symmetrical, normal facial color  Eyes: anicteric,  no proptosis or lid lag  Resp: normally breathing      Labs : I reviewed data from epic and extract and summarize the pertinent data here.   Lab Results   Component Value Date     11/16/2022      Lab Results   Component Value Date    POTASSIUM 4.3 11/16/2022     Lab Results   Component Value Date    CHLORIDE 106 11/16/2022     Lab Results   Component Value Date    SIERRA 8.9 11/16/2022     Lab Results   Component Value Date    CO2 25 11/16/2022     Lab Results   Component Value Date    BUN 11.8 11/16/2022     Lab Results   Component Value Date    CR 0.65 11/16/2022     Lab Results   Component Value Date     GLC 98 11/16/2022     Lab Results   Component Value Date    TSH 1.19 11/16/2022     Lab Results   Component Value Date    T4 1.26 11/16/2022     No results found for: A1C    No results found for: IGF1  No results found for: LH  Lab Results   Component Value Date    FSH 5.3 11/16/2022     No results found for: ESTROGEN  No results found for: PROLACTIN        MRI Brain: I personally reviewed the original images and agree with the below reports.             Assessment and Plan  37 year old female with pituitary cystic lesion    At this point, there is GH deficiency but otherwise all normal range, however her irregular menses may suggest that she may have some other subtle signs of hypopituitarism.     Breast discharge  PRL is normal range at this point    Pituitary cystic lesion  Agree with non-urgent case and evaluated by neuro-ophthalmologist and follow up MRI  Also watching any symptoms of hypopituitarism       RTC with me in 6 month      Total 60 minutes spent on the date of the encounter doing chart review, history and exam, documentation and further activities as noted above.        Katelyn Crawford MD  Staff Physician  Endocrinology and Metabolism  License: [id number]

## 2022-12-09 ENCOUNTER — TELEPHONE (OUTPATIENT)
Dept: ENDOCRINOLOGY | Facility: CLINIC | Age: 37
End: 2022-12-09

## 2023-01-23 NOTE — PROGRESS NOTES
"     Martita Salazar is a 37 year old female with the following diagnoses:   1. Pituitary cyst (H)    2. Visual field defect         Patient was sent for consultation by Dr. Woodward for for pituitary lesion.    HPI:    Patient reports she began to have episodes of \"TV static\" in her vision dating back to .  She would either experience a blob in her central vision, a spot in the peripheral vision that would grow outward, or diffuse static throughout her vision in BOTH episodes.  These episodes always last about 20-30 minutes.  From onset, she typically gets them about once a month (consistently until Oct 2022, has not recurred since), and her  notes she notices them more often during stressful times.  Denies associated headaches, light/sound sensitivity, but visual symptoms are often followed by fatigue.  She denies a formal migraine diagnosis or family history of migraines.    Patient reports she has also has noticed dizziness specifically when pushing a cart.    She developed left-sided facial and tongue numbness in the beginning of 2022.  She scheduled a visit with her PCP, Dr. Brunson, who ordered an MRI leading to a diagnosis of a pituitary cyst. She was seen by Dr. Woodward on 2022.  At that time she had been experiencing frontal headaches about 2-3x/week that she associated with stress.  These have become less frequent since onset and are relieved with rest or Tylenol/ibuprofen.  She denies double vision or changes in her peripheral vision/difficulty with ambulation.    Independent historians:  Patient    Review of outside testin/7/22 -- MRI Brain WWO    IMPRESSION:   1.  Negative for acute intracranial process.     2.  Cystlike enlargement of the adenohypophysis. Rathke's pouch cyst   is favored. Atypical cystic pituitary macroadenoma is a less likely   consideration.     3.  Prominent Meckel's cave bilaterally is nonspecific but may be   seen in cases of idiopathic " "intracranial hypertension.     4.  Subtle low signal changes throughout the calvarial marrow spaces   is nonspecific. In a patient of this young age this may represent   normal anatomical \"red marrow \"variation.      My interpretation performed today of outside testing:  I have independently reviewed MRI Brain WWO performed 11/7/22. There is a cystic sellar lesion that abuts the chiasm inferiorly without definitive compression.       Review of outside clinical notes:    11/16/22 -- Visit with Dr. Woodward    1. I reviewed the results of her MRI and CT imaging which does show signs of a pituitary cyst and we had a discussion about the likely diagnosis of rathke's cyst versus other types of pituitary lesions. In comparison of her imaging to 2018, there is a stable or slightly increased size, but no major changes.   2. I discussed possible treatment options including observation, surgery, and medical management depending on her lab results which she obtained this morning. Surgery would be indicated if she was experiencing vision changes or other hormone related symptoms but in the absence of symptoms, I would like to perform a broader workup before determining if surgery is needed.  3. Referral to neuro ophthalmology   4. Refferral to endocrinology   5. As a backup plan, we will arrange a followup MRI in 1 year. If there are concerns from ophthalmology or endocrinology, we can followup sooner.      12/7/22 -- Visit with Dr. Crawford     At this point, there is GH deficiency but otherwise all normal range, however her irregular menses may suggest that she may have some other subtle signs of hypopituitarism.      Breast discharge  PRL is normal range at this point     Pituitary cystic lesion  Agree with non-urgent case and evaluated by neuro-ophthalmologist and follow up MRI  Also watching any symptoms of hypopituitarism      Past medical history:    Patient Active Problem List   Diagnosis     Parotid cyst     Infective " otitis externa, right         Medications:   None currently    Family history / social history:  Patient's family history HTN, T2DM (father).    Patient  reports that she has never smoked. She has never used smokeless tobacco. She reports current alcohol use. She reports that she does not use drugs.       Exam:  Uncorrected distance visual acuity was 20/20 in the right eye and 20/20 in the left eye. Intraocular pressure was 09 in the right eye and 10 in the left eye using ICare.  Color vision 11/11 right eye and 11/11 left eye.  Pupils round and reactive with no APD.  Anterior segment and Fundus exam were unremarkable. Strabismus exam was unremarkable.    Tests ordered and interpreted today:  GTop:  Right eye: Reliable.  Mean deviation -1.4 dB.  Central scotoma, otherwise full.  Left eye: Reliable.  Mean deviation -1.8 dB.  Early ST defect.    OCT RNFL:  Right eye: Average RNFL 90 microns.  Normal.  Left eye: Average RNFL 90 microns.  Normal.        Assessment/Plan:   Patient assessed today following diagnosis of pituitary cyst in October 2022.  Today, visual field testing revealed a very shallow central scotoma in the RIGHT eye and a very subtle superotemporal defect in the LEFT eye.  I discussed that this field change could be a result of compression by the pituitary cyst fitting the findings of a junctional scotoma. It is also possible that this is first time artifact from taking a visual field test.   that another visual field is warranted to see if it is a repeatable defect.   Follow-up in 4-6 months or sooner with vision changes. As an aside, she describes episodes of ocular migraine dating back to 2018, which are unrelated to the cyst.     She is set to follow-up with Dr. Woodward in 1 year and with endocrinology in 6 months.         Attending Physician Attestation:  Complete documentation of historical and exam elements from today's encounter can be found in the full encounter summary report (not  reduplicated in this progress note).  I personally obtained the chief complaint(s) and history of present illness.  I confirmed and edited as necessary the review of systems, past medical/surgical history, family history, social history, and examination findings as documented by others; and I examined the patient myself.  I personally reviewed the relevant tests, images, and reports as documented above.  I formulated and edited as necessary the assessment and plan and discussed the findings and management plan with the patient and family. - Jc Acevedo MD    Precharting:  Evert Gerard MD PhD  Fellow, Neuro-Ophthalmology    Lili Tovar, ADA

## 2023-01-24 ENCOUNTER — OFFICE VISIT (OUTPATIENT)
Dept: OPHTHALMOLOGY | Facility: CLINIC | Age: 38
End: 2023-01-24
Attending: NEUROLOGICAL SURGERY
Payer: COMMERCIAL

## 2023-01-24 DIAGNOSIS — H53.40 VISUAL FIELD DEFECT: ICD-10-CM

## 2023-01-24 DIAGNOSIS — H53.40 VISUAL FIELD DEFECT: Primary | ICD-10-CM

## 2023-01-24 DIAGNOSIS — E23.6 PITUITARY CYST (H): ICD-10-CM

## 2023-01-24 PROCEDURE — 92133 CPTRZD OPH DX IMG PST SGM ON: CPT | Performed by: OPHTHALMOLOGY

## 2023-01-24 PROCEDURE — 92083 EXTENDED VISUAL FIELD XM: CPT | Performed by: OPHTHALMOLOGY

## 2023-01-24 PROCEDURE — 99204 OFFICE O/P NEW MOD 45 MIN: CPT | Performed by: OPHTHALMOLOGY

## 2023-01-24 PROCEDURE — G0463 HOSPITAL OUTPT CLINIC VISIT: HCPCS | Mod: 25

## 2023-01-24 ASSESSMENT — CONF VISUAL FIELD
OS_INFERIOR_NASAL_RESTRICTION: 0
METHOD: COUNTING FINGERS
OS_SUPERIOR_TEMPORAL_RESTRICTION: 0
OS_INFERIOR_TEMPORAL_RESTRICTION: 0
OD_SUPERIOR_NASAL_RESTRICTION: 0
OD_INFERIOR_NASAL_RESTRICTION: 0
OD_NORMAL: 1
OS_NORMAL: 1
OD_INFERIOR_TEMPORAL_RESTRICTION: 0
OD_SUPERIOR_TEMPORAL_RESTRICTION: 0
OS_SUPERIOR_NASAL_RESTRICTION: 0

## 2023-01-24 ASSESSMENT — EXTERNAL EXAM - RIGHT EYE: OD_EXAM: NORMAL

## 2023-01-24 ASSESSMENT — SLIT LAMP EXAM - LIDS
COMMENTS: NORMAL
COMMENTS: NORMAL

## 2023-01-24 ASSESSMENT — CUP TO DISC RATIO
OS_RATIO: 0.45
OD_RATIO: 0.45

## 2023-01-24 ASSESSMENT — TONOMETRY
OS_IOP_MMHG: 10
OD_IOP_MMHG: 09
IOP_METHOD: ICARE

## 2023-01-24 ASSESSMENT — VISUAL ACUITY
OD_SC: 20/20
OS_SC: 20/20
METHOD: SNELLEN - LINEAR

## 2023-01-24 ASSESSMENT — EXTERNAL EXAM - LEFT EYE: OS_EXAM: NORMAL

## 2023-01-24 NOTE — LETTER
"2023         RE:  :  MRN: Martita Salazar  1985  [id number]     Dear Dr. Zaragoza,    Thank you for asking me to see your very pleasant patient, Martita Salazar, in neuro-ophthalmic consultation.  I would like to thank you for sending your records and I have summarized them in the history of present illness.   My assessment and plan are below.  For further details, please see my attached clinic note.      Martita Salazar is a 37 year old female with the following diagnoses:   1. Pituitary cyst (H)    2. Visual field defect         Patient was sent for consultation by Dr. Woodward for for pituitary lesion.    HPI:    Patient reports she began to have episodes of \"TV static\" in her vision dating back to .  She would either experience a blob in her central vision, a spot in the peripheral vision that would grow outward, or diffuse static throughout her vision in BOTH episodes.  These episodes always last about 20-30 minutes.  From onset, she typically gets them about once a month (consistently until Oct 2022, has not recurred since), and her  notes she notices them more often during stressful times.  Denies associated headaches, light/sound sensitivity, but visual symptoms are often followed by fatigue.  She denies a formal migraine diagnosis or family history of migraines.    Patient reports she has also has noticed dizziness specifically when pushing a cart.    She developed left-sided facial and tongue numbness in the beginning of 2022.  She scheduled a visit with her PCP, Dr. Brunson, who ordered an MRI leading to a diagnosis of a pituitary cyst. She was seen by Dr. Woodward on 2022.  At that time she had been experiencing frontal headaches about 2-3x/week that she associated with stress.  These have become less frequent since onset and are relieved with rest or Tylenol/ibuprofen.  She denies double vision or changes in her peripheral vision/difficulty with " "ambulation.    Independent historians: Patient    Review of outside testin/7/22 -- MRI Brain WWO    IMPRESSION:   1.  Negative for acute intracranial process.     2.  Cystlike enlargement of the adenohypophysis. Rathke's pouch cyst   is favored. Atypical cystic pituitary macroadenoma is a less likely   consideration.     3.  Prominent Meckel's cave bilaterally is nonspecific but may be   seen in cases of idiopathic intracranial hypertension.     4.  Subtle low signal changes throughout the calvarial marrow spaces   is nonspecific. In a patient of this young age this may represent   normal anatomical \"red marrow \"variation.      My interpretation performed today of outside testing:  I have independently reviewed MRI Brain WWO performed 22. There is a cystic sellar lesion that abuts the chiasm inferiorly without definitive compression.       Review of outside clinical notes:    22 -- Visit with Dr. Woodward    1. I reviewed the results of her MRI and CT imaging which does show signs of a pituitary cyst and we had a discussion about the likely diagnosis of rathke's cyst versus other types of pituitary lesions. In comparison of her imaging to 2018, there is a stable or slightly increased size, but no major changes.   2. I discussed possible treatment options including observation, surgery, and medical management depending on her lab results which she obtained this morning. Surgery would be indicated if she was experiencing vision changes or other hormone related symptoms but in the absence of symptoms, I would like to perform a broader workup before determining if surgery is needed.  3. Referral to neuro ophthalmology   4. Refferral to endocrinology   5. As a backup plan, we will arrange a followup MRI in 1 year. If there are concerns from ophthalmology or endocrinology, we can followup sooner.      22 -- Visit with Dr. Crawford     At this point, there is GH deficiency but otherwise all normal " range, however her irregular menses may suggest that she may have some other subtle signs of hypopituitarism.      Breast discharge  PRL is normal range at this point     Pituitary cystic lesion  Agree with non-urgent case and evaluated by neuro-ophthalmologist and follow up MRI  Also watching any symptoms of hypopituitarism   Past medical history:    Patient Active Problem List   Diagnosis     Parotid cyst     Infective otitis externa, right       Medications: None currently    Family history / social history:  Patient's family history HTN, T2DM (father).    Patient  reports that she has never smoked. She has never used smokeless tobacco. She reports current alcohol use. She reports that she does not use drugs.     Exam:  Uncorrected distance visual acuity was 20/20 in the right eye and 20/20 in the left eye. Intraocular pressure was 09 in the right eye and 10 in the left eye using ICare.  Color vision 11/11 right eye and 11/11 left eye.  Pupils round and reactive with no APD.  Anterior segment and Fundus exam were unremarkable. Strabismus exam was unremarkable.    Tests ordered and interpreted today:  GTop:  Right eye: Reliable.  Mean deviation -1.4 dB.  Central scotoma, otherwise full.  Left eye: Reliable.  Mean deviation -1.8 dB.  Early ST defect.    OCT RNFL:  Right eye: Average RNFL 90 microns.  Normal.  Left eye: Average RNFL 90 microns.  Normal.    Assessment/Plan:   Patient assessed today following diagnosis of pituitary cyst in October 2022.  Today, visual field testing revealed a very shallow central scotoma in the RIGHT eye and a very subtle superotemporal defect in the LEFT eye.  I discussed that this field change could be a result of compression by the pituitary cyst fitting the findings of a junctional scotoma. It is also possible that this is first time artifact from taking a visual field test.   that another visual field is warranted to see if it is a repeatable defect.   Follow-up in 4-6 months or  sooner with vision changes. As an aside, she describes episodes of ocular migraine dating back to 2018, which are unrelated to the cyst.     She is set to follow-up with Dr. Woodward in 1 year and with endocrinology in 6 months.      Again, thank you for allowing me to participate in the care of your patient.      Sincerely,    Jc Acevedo MD  Professor  Ophthalmology Residency   Director of Neuro-Ophthalmology  [employer] [facility] [employer] Endowed Chair  Departments of Ophthalmology, Neurology, and Neurosurgery  [address]  [address]  T - [phone]  F - [phone]  [email or web address] [email or web address]      CC: William Zaragoza  [facility]  [address] [facility] [address]  Via Fax: [phone]     Jreome Woodward MD  [address] [facility] [address]  Via In Basket    DX = possible early junctional scotoma, pituitary cyst

## 2023-01-24 NOTE — Clinical Note
"2023       RE: Martita Salazar  [address] [facility] [address]     Dear Colleague,    Thank you for referring your patient, Martita Salazar, to the [facility]. Please see a copy of my visit note below.        Martita Salazar is a 37 year old female with the following diagnoses:   1. Pituitary cyst (H)    2. Visual field defect         Patient was sent for consultation by Dr. Woodward for for pituitary lesion.    HPI:    Patient reports she began to have episodes of \"TV static\" in her vision dating back to .  She would either experience a blob in her central vision, a spot in the peripheral vision that would grow outward, or diffuse static throughout her vision in BOTH episodes.  These episodes always last about 20-30 minutes.  From onset, she typically gets them about once a month (consistently until Oct 2022, has not recurred since), and her  notes she notices them more often during stressful times.  Denies associated headaches, light/sound sensitivity, but visual symptoms are often followed by fatigue.  She denies a formal migraine diagnosis or family history of migraines.    Patient reports she has also has noticed dizziness specifically when pushing a cart.    She developed left-sided facial and tongue numbness in the beginning of 2022.  She scheduled a visit with her PCP, Dr. Brunson, who ordered an MRI leading to a diagnosis of a pituitary cyst. She was seen by Dr. Woodward on 2022.  At that time she had been experiencing frontal headaches about 2-3x/week that she associated with stress.  These have become less frequent since onset and are relieved with rest or Tylenol/ibuprofen.  She denies double vision or changes in her peripheral vision/difficulty with ambulation.    Independent historians:  Patient    Review of outside testin/7/22 -- MRI Brain WWO    IMPRESSION:   1. " " Negative for acute intracranial process.     2.  Cystlike enlargement of the adenohypophysis. Rathke's pouch cyst   is favored. Atypical cystic pituitary macroadenoma is a less likely   consideration.     3.  Prominent Meckel's cave bilaterally is nonspecific but may be   seen in cases of idiopathic intracranial hypertension.     4.  Subtle low signal changes throughout the calvarial marrow spaces   is nonspecific. In a patient of this young age this may represent   normal anatomical \"red marrow \"variation.      My interpretation performed today of outside testing:  I have independently reviewed MRI Brain WWO performed 11/7/22. There is a cystic sellar lesion that abuts the chiasm inferiorly without definitive compression.       Review of outside clinical notes:    11/16/22 -- Visit with Dr. Woodward    1. I reviewed the results of her MRI and CT imaging which does show signs of a pituitary cyst and we had a discussion about the likely diagnosis of rathke's cyst versus other types of pituitary lesions. In comparison of her imaging to 2018, there is a stable or slightly increased size, but no major changes.   2. I discussed possible treatment options including observation, surgery, and medical management depending on her lab results which she obtained this morning. Surgery would be indicated if she was experiencing vision changes or other hormone related symptoms but in the absence of symptoms, I would like to perform a broader workup before determining if surgery is needed.  3. Referral to neuro ophthalmology   4. Refferral to endocrinology   5. As a backup plan, we will arrange a followup MRI in 1 year. If there are concerns from ophthalmology or endocrinology, we can followup sooner.      12/7/22 -- Visit with Dr. Crawford     At this point, there is GH deficiency but otherwise all normal range, however her irregular menses may suggest that she may have some other subtle signs of hypopituitarism.      Breast " discharge  PRL is normal range at this point     Pituitary cystic lesion  Agree with non-urgent case and evaluated by neuro-ophthalmologist and follow up MRI  Also watching any symptoms of hypopituitarism      Past medical history:    Patient Active Problem List   Diagnosis     Parotid cyst     Infective otitis externa, right         Medications:   None currently    Family history / social history:  Patient's family history HTN, T2DM (father).    Patient  reports that she has never smoked. She has never used smokeless tobacco. She reports current alcohol use. She reports that she does not use drugs.       Exam:  Uncorrected distance visual acuity was 20/20 in the right eye and 20/20 in the left eye. Intraocular pressure was 09 in the right eye and 10 in the left eye using ICare.  Color vision 11/11 right eye and 11/11 left eye.  Pupils round and reactive with no APD.  Anterior segment and Fundus exam were unremarkable. Strabismus exam was unremarkable.    Tests ordered and interpreted today:  GTop:  Right eye: Reliable.  Mean deviation -1.4 dB.  Central scotoma, otherwise full.  Left eye: Reliable.  Mean deviation -1.8 dB.  Early ST defect.    OCT RNFL:  Right eye: Average RNFL 90 microns.  Normal.  Left eye: Average RNFL 90 microns.  Normal.        Assessment/Plan:   Patient assessed today following diagnosis of pituitary cyst in October 2022.  Today, visual field testing revealed a very shallow central scotoma in the RIGHT eye and a very subtle superotemporal defect in the LEFT eye.  I discussed that this field change could be a result of compression by the pituitary cyst fitting the findings of a junctional scotoma. It is also possible that this is first time artifact from taking a visual field test.   that another visual field is warranted to see if it is a repeatable defect.   Follow-up in 4-6 months or sooner with vision changes. As an aside, she describes episodes of ocular migraine dating back to 2018, which  are unrelated to the cyst.     She is set to follow-up with Dr. Woodward in 1 year and with endocrinology in 6 months.        Attending Physician Attestation:  Complete documentation of historical and exam elements from today's encounter can be found in the full encounter summary report (not reduplicated in this progress note).  I personally obtained the chief complaint(s) and history of present illness.  I confirmed and edited as necessary the review of systems, past medical/surgical history, family history, social history, and examination findings as documented by others; and I examined the patient myself.  I personally reviewed the relevant tests, images, and reports as documented above.  I formulated and edited as necessary the assessment and plan and discussed the findings and management plan with the patient and family. - Jc Acevedo MD    Precharting:  Evert Gerard MD PhD  Fellow, Neuro-Ophthalmology    Lili Tovar, OD        Again, thank you for allowing me to participate in the care of your patient.      Sincerely,    Jc Acevedo MD

## 2023-01-28 ENCOUNTER — HEALTH MAINTENANCE LETTER (OUTPATIENT)
Age: 38
End: 2023-01-28

## 2023-06-12 ENCOUNTER — OFFICE VISIT (OUTPATIENT)
Dept: OPHTHALMOLOGY | Facility: CLINIC | Age: 38
End: 2023-06-12
Attending: OPHTHALMOLOGY
Payer: COMMERCIAL

## 2023-06-12 ENCOUNTER — LAB (OUTPATIENT)
Dept: LAB | Facility: CLINIC | Age: 38
End: 2023-06-12
Payer: COMMERCIAL

## 2023-06-12 DIAGNOSIS — E23.6 PITUITARY CYST (H): Primary | ICD-10-CM

## 2023-06-12 DIAGNOSIS — H53.40 VISUAL FIELD DEFECT: Primary | ICD-10-CM

## 2023-06-12 DIAGNOSIS — H53.40 VISUAL FIELD DEFECT: ICD-10-CM

## 2023-06-12 DIAGNOSIS — E23.6 PITUITARY CYST (H): ICD-10-CM

## 2023-06-12 LAB
ANION GAP SERPL CALCULATED.3IONS-SCNC: 7 MMOL/L (ref 7–15)
BUN SERPL-MCNC: 9.6 MG/DL (ref 6–20)
CALCIUM SERPL-MCNC: 9.2 MG/DL (ref 8.6–10)
CHLORIDE SERPL-SCNC: 105 MMOL/L (ref 98–107)
CORTIS SERPL-MCNC: 11.5 UG/DL
CREAT SERPL-MCNC: 0.7 MG/DL (ref 0.51–0.95)
DEPRECATED HCO3 PLAS-SCNC: 27 MMOL/L (ref 22–29)
FSH SERPL IRP2-ACNC: 5.9 MIU/ML
GFR SERPL CREATININE-BSD FRML MDRD: >90 ML/MIN/1.73M2
GLUCOSE SERPL-MCNC: 99 MG/DL (ref 70–99)
POTASSIUM SERPL-SCNC: 4.1 MMOL/L (ref 3.4–5.3)
SODIUM SERPL-SCNC: 139 MMOL/L (ref 136–145)
T4 FREE SERPL-MCNC: 0.98 NG/DL (ref 0.9–1.7)
TSH SERPL DL<=0.005 MIU/L-ACNC: 1.24 UIU/ML (ref 0.3–4.2)

## 2023-06-12 PROCEDURE — 84439 ASSAY OF FREE THYROXINE: CPT | Performed by: PATHOLOGY

## 2023-06-12 PROCEDURE — 80048 BASIC METABOLIC PNL TOTAL CA: CPT | Performed by: PATHOLOGY

## 2023-06-12 PROCEDURE — 83001 ASSAY OF GONADOTROPIN (FSH): CPT | Mod: 90 | Performed by: PATHOLOGY

## 2023-06-12 PROCEDURE — 82533 TOTAL CORTISOL: CPT | Mod: 90 | Performed by: PATHOLOGY

## 2023-06-12 PROCEDURE — 92083 EXTENDED VISUAL FIELD XM: CPT | Performed by: OPHTHALMOLOGY

## 2023-06-12 PROCEDURE — 84305 ASSAY OF SOMATOMEDIN: CPT | Mod: 90 | Performed by: PATHOLOGY

## 2023-06-12 PROCEDURE — 92133 CPTRZD OPH DX IMG PST SGM ON: CPT | Performed by: OPHTHALMOLOGY

## 2023-06-12 PROCEDURE — 99000 SPECIMEN HANDLING OFFICE-LAB: CPT | Performed by: PATHOLOGY

## 2023-06-12 PROCEDURE — 36415 COLL VENOUS BLD VENIPUNCTURE: CPT | Performed by: PATHOLOGY

## 2023-06-12 PROCEDURE — 92014 COMPRE OPH EXAM EST PT 1/>: CPT | Performed by: OPHTHALMOLOGY

## 2023-06-12 PROCEDURE — G0463 HOSPITAL OUTPT CLINIC VISIT: HCPCS | Performed by: OPHTHALMOLOGY

## 2023-06-12 PROCEDURE — 82024 ASSAY OF ACTH: CPT | Mod: 90 | Performed by: PATHOLOGY

## 2023-06-12 PROCEDURE — 84443 ASSAY THYROID STIM HORMONE: CPT | Performed by: PATHOLOGY

## 2023-06-12 ASSESSMENT — VISUAL ACUITY
OD_SC+: -1
METHOD: SNELLEN - LINEAR
OD_SC: 20/20
OS_SC+: -1
OS_SC: 20/20

## 2023-06-12 ASSESSMENT — CONF VISUAL FIELD
OD_SUPERIOR_NASAL_RESTRICTION: 0
OD_INFERIOR_TEMPORAL_RESTRICTION: 0
METHOD: COUNTING FINGERS
OS_INFERIOR_TEMPORAL_RESTRICTION: 0
OD_NORMAL: 1
OS_SUPERIOR_NASAL_RESTRICTION: 0
OS_INFERIOR_NASAL_RESTRICTION: 0
OS_NORMAL: 1
OD_INFERIOR_NASAL_RESTRICTION: 0
OD_SUPERIOR_TEMPORAL_RESTRICTION: 0
OS_SUPERIOR_TEMPORAL_RESTRICTION: 0

## 2023-06-12 ASSESSMENT — EXTERNAL EXAM - LEFT EYE: OS_EXAM: NORMAL

## 2023-06-12 ASSESSMENT — TONOMETRY
OS_IOP_MMHG: 15
IOP_METHOD: TONOPEN
OD_IOP_MMHG: 16

## 2023-06-12 ASSESSMENT — SLIT LAMP EXAM - LIDS
COMMENTS: NORMAL
COMMENTS: NORMAL

## 2023-06-12 ASSESSMENT — EXTERNAL EXAM - RIGHT EYE: OD_EXAM: NORMAL

## 2023-06-12 ASSESSMENT — CUP TO DISC RATIO
OD_RATIO: 0.45
OS_RATIO: 0.45

## 2023-06-12 NOTE — PROGRESS NOTES
Martita Salazar is a 37 year old female with the following diagnoses:   1. Pituitary cyst (H)    2. Visual field defect       Follow up pituitary cyst.  Last visit was January 2023. At that time, she had some mild visual field changes that could be interpreted as a junctional scotoma vs. Artifact.  We recommended repeat visual field testing to determine if this is repeatable or not.  She has a history of ocular migraines since 2018.      Today, she reports that her vision remains unchanged with no new pain in the eyes.    Uncorrected distance visual acuity was 20/20 -1 in the right eye and 20/20 -1 in the left eye. Intraocular pressure was 16 in the right eye and 15 in the left eye using Tonopen. Comments: Lids Held.  Color vision 10/11 right eye and 10/11 left eye.  Pupils isocoric.  Anterior segment exam unremarkable.  Fundus exam unremarkable.     Glaucoma Top OU    Comparison: 1/2023  OD: Full/stable  OS: Improved mild superior depressions       OCT Optic Nerve RNFL Spectralis OU (both eyes)    Full/stable RNFL thickness OU  Average 92/92  Equivocal inferior GCL thinning OS from 1/2023     Linked Images                        Assessment:  It is my impression that she has a pituitary cyst. Today, her visual fields show improvement from 1/2023. I do not believe that she has a compressive optic neuropathy at this time.   I think observation with repeat testing in 9 months is reasonable sooner as needed for worsening symptoms.           Attending Physician Attestation:  Complete documentation of historical and exam elements from today's encounter can be found in the full encounter summary report (not reduplicated in this progress note).  I personally obtained the chief complaint(s) and history of present illness.  I confirmed and edited as necessary the review of systems, past medical/surgical history, family history, social history, and examination findings as documented by others; and I examined the patient  myself.  I personally reviewed the relevant tests, images, and reports as documented above.  I formulated and edited as necessary the assessment and plan and discussed the findings and management plan with the patient and family. - Jc Gerard MD PhD  Fellow, Neuro-Ophthalmology

## 2023-06-13 LAB — IGF-I BLD-MCNC: 116 NG/ML (ref 79–276)

## 2023-06-14 LAB — ACTH PLAS-MCNC: 18 PG/ML

## 2023-06-21 ENCOUNTER — TELEPHONE (OUTPATIENT)
Dept: ENDOCRINOLOGY | Facility: CLINIC | Age: 38
End: 2023-06-21

## 2023-06-21 ENCOUNTER — VIRTUAL VISIT (OUTPATIENT)
Dept: ENDOCRINOLOGY | Facility: CLINIC | Age: 38
End: 2023-06-21
Payer: COMMERCIAL

## 2023-06-21 DIAGNOSIS — D35.2 PITUITARY ADENOMA (H): Primary | ICD-10-CM

## 2023-06-21 PROCEDURE — 99214 OFFICE O/P EST MOD 30 MIN: CPT | Mod: VID | Performed by: INTERNAL MEDICINE

## 2023-06-21 NOTE — TELEPHONE ENCOUNTER
Per Dr Crawford's request I faxed the patient's orders to [facility] at [phone].  Myriam Delaney

## 2023-06-21 NOTE — LETTER
6/21/2023       RE: Martita Salazar  [address]  [facility] [address]     Dear Colleague,    Thank you for referring your patient, Martita Salazar, to the [facility] ENDOCRINOLOGY [facility] [place] at [facility]. Please see a copy of my visit note below.    Martita Salazar is being evaluated via a billable video visit.        How would you like to obtain your AVS? MyChart  For the video visit, send the invitation by: Send to e-mail at: [email or web address]  Will anyone else be joining your video visit? No      Video visit  Start 11:08 am  End 11:30 am  [place]                                                                               - Endocrinology Follow up -    Reason for visit/consult:  Pituitary cyst (H)    Primary care provider: William Zaragoza        Assessment and Plan  37 year old female with pituitary cystic lesion    Pituitary cystic lesion  Agree with non-urgent case and evaluated by neuro-ophthalmologist Dr. Acevedo and currently no signs of optic chiasm compression.     - agree with 1 year follow up DENICE in 11/2023    GH deficiency   It was 53 in 12/2022 but follow up in 6 month, now 116 normalized.     Breast discharge  PRL is normal range at this point and rarely occurs    TFT  Free T4 mid normal, if she started to concern about her energy level, then we can repeat TFT and may consider small dose of LT4      RTC with me in 1 year      Total 35 minutes spent on the date of the encounter doing chart review, history and exam, documentation and further activities as noted above.        Katelyn Crawford MD  Staff Physician  Endocrinology and Metabolism  License: [id number]    Interval History as of 6/21/2023 : Patient has been doing well and feeling better for the past 3 months. New event: no pertinent medical event noted.   HPI: A 38 yo female here for the endocrine evaluation for her pituitary cystic lesion.  Her pituitary cystic lesion was incidentally  found in October 2022 when she had numbness on the left side of her tongue she underwent to MRI and found out cystic lesion.  She was seen by  and pituitary function test was done which showed TSH 1.19, free T4 1.26 normal thyroid function, prolactin 15, a.m. cortisol 12.9 her IGF-I was low 54.   Currently she mentioned occasional light sensitivity, increased sensitivity for the sense of smell, occasional nausea, she still has some feeling of leaking of the breastmilk.  She had childbirth 2017 and 2020 and currently not breast-feeding for the past 1 year however she still have some sensation.   Her menarche age 10 and has been regular menstrual cycle however after the second birth of the child her menstrual cycle has been irregular range from 21 to 31 days.  No other significant medical history noted.  Family history including diabetes in her family.     Past Medical/Surgical History:  Past Medical History:   Diagnosis Date    Allergic rhinitis      Past Surgical History:   Procedure Laterality Date    GYN SURGERY      PAROTIDECTOMY Right 08/08/2018    Procedure: PAROTIDECTOMY;  Right Parotidectomy;  Surgeon: Orlando Viera MD;  Location:  OR       Allergies:  Allergies   Allergen Reactions    Azithromycin Nausea and Vomiting       Current Medications   Current Outpatient Medications   Medication    ibuprofen (ADVIL/MOTRIN) 200 MG tablet     No current facility-administered medications for this visit.       Family History:  Family History   Problem Relation Age of Onset    Strabismus No family hx of     Amblyopia No family hx of        Social History:  Social History     Tobacco Use    Smoking status: Never    Smokeless tobacco: Never   Substance Use Topics    Alcohol use: Yes       ROS:  Full review of systems taken with the help of the intake sheet. Otherwise a complete 14 point review of systems was taken and is negative unless stated in the history above.      Physical Exam:   Vitals:  There were no vitals taken for this visit.  BMI= There is no height or weight on file to calculate BMI.   General: well appearing, no acute distress, pleasant and conversant,   Mental Status/neuro: alert and oriented  Face: symmetrical, normal facial color  Eyes: anicteric,  no proptosis or lid lag  Resp: normally breathing      Labs : I reviewed data from epic and extract and summarize the pertinent data here.   Lab Results   Component Value Date     11/16/2022      Lab Results   Component Value Date    POTASSIUM 4.3 11/16/2022     Lab Results   Component Value Date    CHLORIDE 106 11/16/2022     Lab Results   Component Value Date    SIERRA 8.9 11/16/2022     Lab Results   Component Value Date    CO2 25 11/16/2022     Lab Results   Component Value Date    BUN 11.8 11/16/2022     Lab Results   Component Value Date    CR 0.65 11/16/2022     Lab Results   Component Value Date    GLC 98 11/16/2022     Lab Results   Component Value Date    TSH 1.19 11/16/2022     Lab Results   Component Value Date    T4 1.26 11/16/2022     No results found for: A1C    No results found for: IGF1  No results found for: LH  Lab Results   Component Value Date    FSH 5.3 11/16/2022     No results found for: ESTROGEN  No results found for: PROLACTIN        MRI Brain: I personally reviewed the original images and agree with the below reports.     11/2022

## 2023-06-21 NOTE — PROGRESS NOTES
Martita Salazar is being evaluated via a billable video visit.        How would you like to obtain your AVS? ECKey  For the video visit, send the invitation by: Send to e-mail at: [email or web address]  Will anyone else be joining your video visit? No      Video visit  Start 11:08 am  End 11:30 am  [place]                                                                               - Endocrinology Follow up -    Reason for visit/consult:  Pituitary cyst (H)    Primary care provider: William Zaragoza        Assessment and Plan  37 year old female with pituitary cystic lesion    Pituitary cystic lesion  Agree with non-urgent case and evaluated by neuro-ophthalmologist Dr. Acevedo and currently no signs of optic chiasm compression.     - agree with 1 year follow up DENICE in 11/2023    GH deficiency   It was 53 in 12/2022 but follow up in 6 month, now 116 normalized.     Breast discharge  PRL is normal range at this point and rarely occurs    TFT  Free T4 mid normal, if she started to concern about her energy level, then we can repeat TFT and may consider small dose of LT4      RTC with me in 1 year      Total 35 minutes spent on the date of the encounter doing chart review, history and exam, documentation and further activities as noted above.        Katelyn Crawford MD  Staff Physician  Endocrinology and Metabolism  License: [id number]    Interval History as of 6/21/2023 : Patient has been doing well and feeling better for the past 3 months. New event: no pertinent medical event noted.   HPI: A 36 yo female here for the endocrine evaluation for her pituitary cystic lesion.  Her pituitary cystic lesion was incidentally found in October 2022 when she had numbness on the left side of her tongue she underwent to MRI and found out cystic lesion.  She was seen by  and pituitary function test was done which showed TSH 1.19, free T4 1.26 normal thyroid function, prolactin 15, a.m. cortisol 12.9 her IGF-I was low 54.   Currently  she mentioned occasional light sensitivity, increased sensitivity for the sense of smell, occasional nausea, she still has some feeling of leaking of the breastmilk.  She had childbirth 2017 and 2020 and currently not breast-feeding for the past 1 year however she still have some sensation.   Her menarche age 10 and has been regular menstrual cycle however after the second birth of the child her menstrual cycle has been irregular range from 21 to 31 days.  No other significant medical history noted.  Family history including diabetes in her family.     Past Medical/Surgical History:  Past Medical History:   Diagnosis Date     Allergic rhinitis      Past Surgical History:   Procedure Laterality Date     GYN SURGERY       PAROTIDECTOMY Right 08/08/2018    Procedure: PAROTIDECTOMY;  Right Parotidectomy;  Surgeon: Orlando Viera MD;  Location: [facility] OR       Allergies:  Allergies   Allergen Reactions     Azithromycin Nausea and Vomiting       Current Medications   Current Outpatient Medications   Medication     ibuprofen (ADVIL/MOTRIN) 200 MG tablet     No current facility-administered medications for this visit.       Family History:  Family History   Problem Relation Age of Onset     Strabismus No family hx of      Amblyopia No family hx of        Social History:  Social History     Tobacco Use     Smoking status: Never     Smokeless tobacco: Never   Substance Use Topics     Alcohol use: Yes       ROS:  Full review of systems taken with the help of the intake sheet. Otherwise a complete 14 point review of systems was taken and is negative unless stated in the history above.      Physical Exam:   Vitals: There were no vitals taken for this visit.  BMI= There is no height or weight on file to calculate BMI.   General: well appearing, no acute distress, pleasant and conversant,   Mental Status/neuro: alert and oriented  Face: symmetrical, normal facial color  Eyes: anicteric,  no proptosis or lid lag  Resp:  normally breathing      Labs : I reviewed data from epic and extract and summarize the pertinent data here.   Lab Results   Component Value Date     11/16/2022      Lab Results   Component Value Date    POTASSIUM 4.3 11/16/2022     Lab Results   Component Value Date    CHLORIDE 106 11/16/2022     Lab Results   Component Value Date    SIERRA 8.9 11/16/2022     Lab Results   Component Value Date    CO2 25 11/16/2022     Lab Results   Component Value Date    BUN 11.8 11/16/2022     Lab Results   Component Value Date    CR 0.65 11/16/2022     Lab Results   Component Value Date    GLC 98 11/16/2022     Lab Results   Component Value Date    TSH 1.19 11/16/2022     Lab Results   Component Value Date    T4 1.26 11/16/2022     No results found for: A1C    No results found for: IGF1  No results found for: LH  Lab Results   Component Value Date    FSH 5.3 11/16/2022     No results found for: ESTROGEN  No results found for: PROLACTIN        MRI Brain: I personally reviewed the original images and agree with the below reports.     11/2022

## 2023-06-21 NOTE — NURSING NOTE
.Is the patient currently in the state of [place]? YES    Visit mode:VIDEO    If the visit is dropped, the patient can be reconnected by: VIDEO VISIT: Send to e-mail at: [email or web address]    Will anyone else be joining the visit? NO      How would you like to obtain your AVS? MyChart    Are changes needed to the allergy or medication list? Yes, patient takes probiotic    Reason for visit: RECHECK (Follow up )

## 2023-06-22 ENCOUNTER — TELEPHONE (OUTPATIENT)
Dept: ENDOCRINOLOGY | Facility: CLINIC | Age: 38
End: 2023-06-22
Payer: COMMERCIAL

## 2023-06-22 NOTE — TELEPHONE ENCOUNTER
Faxed patient's Prolactin lab order to [facility] at [phone].  Prolactin added to previously faxed lab orders.  Myriam Delaney

## 2023-06-22 NOTE — TELEPHONE ENCOUNTER
Provider: Katelny Crawford MD Department: [facility]     Visit Disposition    Dispositions   0 Return in about 1 year (around 2024).     Writer contacted patient to schedule follow up per check out notes above from provider. Patient's name and  verified during call to ensure right patient.  Patient scheduled appointment with writer. Patient verbalized agreement with time/date/mode of appointment and did not verbalize any further questions at the time of call.  Patient notes she is having labs done locally and is going to Polar message Dr. Crawford with the results.  Farida MONGE, Virtual Visit Facilitator 10:56 AM 2023

## 2023-09-13 ENCOUNTER — TELEPHONE (OUTPATIENT)
Dept: ENDOCRINOLOGY | Facility: CLINIC | Age: 38
End: 2023-09-13
Payer: COMMERCIAL

## 2023-09-13 NOTE — TELEPHONE ENCOUNTER
Faxed lab orders to [facility] in [facility]   #: [phone]. Attention: Dr. Singh.    Active    TSH   Ordered On: 06/21/2023  Release   T4 free   Ordered On: 06/21/2023  Release   Insulin Growth Factor 1 by Immunoassay   Ordered On: 06/21/2023  Release   Prolactin   Ordered On: 06/21/2023

## 2023-11-10 ENCOUNTER — TRANSFERRED RECORDS (OUTPATIENT)
Dept: ENDOCRINOLOGY | Facility: CLINIC | Age: 38
End: 2023-11-10
Payer: COMMERCIAL

## 2023-11-14 ENCOUNTER — TELEPHONE (OUTPATIENT)
Dept: ENDOCRINOLOGY | Facility: CLINIC | Age: 38
End: 2023-11-14
Payer: COMMERCIAL

## 2023-11-14 NOTE — TELEPHONE ENCOUNTER
Per Dr. Crawford, I requested patient's MRI images be pushed to [employer] PACS. Left a message. Will follow up.  Myriam Delaney

## 2023-12-17 ENCOUNTER — MYC MEDICAL ADVICE (OUTPATIENT)
Dept: ENDOCRINOLOGY | Facility: CLINIC | Age: 38
End: 2023-12-17
Payer: COMMERCIAL

## 2024-01-17 ENCOUNTER — TELEPHONE (OUTPATIENT)
Dept: ENDOCRINOLOGY | Facility: CLINIC | Age: 39
End: 2024-01-17
Payer: COMMERCIAL

## 2024-01-17 NOTE — TELEPHONE ENCOUNTER
Patient call:     Appointment type: return pituitary   Provider: Yvette   Return date: Reschedule 6/5 appt   Speciality phone number: [phone]  Additional appointment(s) needed:   Additional notes: LVM and sent myc jayden Santizo on 1/17/2024 at 1:03 PM

## 2024-01-19 NOTE — TELEPHONE ENCOUNTER
2nd attempt- LVM, sent mychart, sent letter for patient to reschedule 6/5/24 appointment with Dr. Crawford.    Appointment has been canceled.    Please see below for rescheduling information.

## 2024-01-31 ENCOUNTER — VIRTUAL VISIT (OUTPATIENT)
Dept: NEUROSURGERY | Facility: CLINIC | Age: 39
End: 2024-01-31
Payer: COMMERCIAL

## 2024-01-31 VITALS — WEIGHT: 160 LBS | BODY MASS INDEX: 26.66 KG/M2 | HEIGHT: 65 IN

## 2024-01-31 DIAGNOSIS — E23.6 PITUITARY CYST (H): Primary | ICD-10-CM

## 2024-01-31 PROCEDURE — 99214 OFFICE O/P EST MOD 30 MIN: CPT | Mod: 95 | Performed by: NEUROLOGICAL SURGERY

## 2024-01-31 ASSESSMENT — PAIN SCALES - GENERAL: PAINLEVEL: NO PAIN (0)

## 2024-01-31 NOTE — PATIENT INSTRUCTIONS
You were seen today with Dr. Jerome Woodward.    Next steps:  Follow up in 6 months with Vilma Guy, with MRI prior.  Continue follow up as planned with Dr. Acevedo and Dr. Crawford.      How to Contact Us  Send a Kloud[employer] message to your provider.   Call the clinic - your call will be routed appropriately.   Neurosurgery Clinic: [phone]  To speak directly to an RN Care Coordinator:  Felicia RN: [phone]  Piedad RN: [phone]    Note: We do our best to check voicemail frequently throughout the day and make every effort to return calls within 1-2 business days. For urgent matters, please use the general clinic phone numbers listed above.

## 2024-01-31 NOTE — LETTER
2024       RE: Martita Salazar  [address] [facility] [address]       Dear Colleague,    Thank you for referring your patient, Martita Salazar, to the [facility] [place] at [facility]. Please see a copy of my visit note below.    [facility] for Skull Base and Pituitary Surgery  Department of Neurosurgery    Name: Martita Salazar  : 1985  Referring provider: Dr. Zaragoza   24     Reason for visit: pituitary cyst, follow up visit    Dear Dr. Zaragoza ,    It was a pleasure to see Mrs. Salazar in the Center for Skull Base and Pituitary Surgery today, for a follow up visit. As you recall, Mrs. Salazar is a pleasant 38 year-old right-handed female who has a history of neck and ocular issues. She initially noticed symptoms of visual changes when she was pregnant with her daughter about 5 years ago. She describes constant visual changes bilaterally with blurry vision in her peripheral vision. After having her daughter her symptoms improved but they never fully resolved. Since then she has had 3 miscarriages and had her son about 2 years ago. She stopped breast feeding about a year ago but had a prolonged sensation that she needed to pump which she felt was abnormal. She started to have numbness on the left side of her mouth and tongue which lasted about a week but then resolved and have not recurred. This prompted her to obtain MRI imaging which demonstrated findings consistent with pituitary cyst. She had increased frequency in headaches in the past in her frontal region.     Today, she tells me her vision has been doing better and her headaches are not bothering her as much anymore. She has been following up with Dr. Acevedo and Dr. Crawford. From her visit with Dr. Acevedo on 23, her bilateral visual acuity was 20/20 -1 with improved visual field testing. Dr. Crawford seems satisfied with most of her lab values, she  "was considered for starting on levothyroxine and her thyroid function test was repeated.     Review of Systems:   Pertinent items are noted in HPI or as in patient entered ROS below, remainder of complete ROS is negative.     Medications:   oxycodone, sennakot s, dulcolax, tylenol, correctol       Allergies:   Azithromycin      Past Medical History:  pituitary cyst, cervicalgia, term pregnancy, recurrent pregnancy loss, shoulder dystocia in pregnancy, low lying placenta antepartum, incomplete miscarriage, acute suppurative parotitis, blood loss anemia, choroid plexus cyst, Cidf-Mxmw-Kyccbe syndrome, low serum progesterone      Past Surgical History:  ectopic pregnancy removal from fallopian tube, parotid biopsy, reduction of hemorrhoid, dilation and curettage, hysteroscopy, salpingectomy and/or oophorectomy      Family History:   COPD, diabetes, heart disease      Social History:   She lives about [place] hours west from the [place]. 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 They have two children, ages 5 and 2. They are not planning on having more children. She is a nonsmoker.       Physical Exam:   Ht 1.651 m (5' 5\")   Wt 72.6 kg (160 lb)   BMI 26.63 kg/m       General: No acute distress.   Head: No signs of trauma.    Eyes: Conjunctivae are normal.  Mouth/Throat: Oropharynx moist.  Neck: Normal range of motion.    Resp: No respiratory distress.   MSK: Moves all extremities.  No obvious deformity.  Neuro: The patient is fully oriented and quite pleasant. Speech normal. Extraocular movements are intact without nystagmus. Facial sensation is intact in V1, V2, V3 distributions. Facial nerve function is normal, rated as a House Brackmann I/VI, without synkinesis.  Palate is symmetric. Shoulders are full strength. Tongue is midline. There is no pronator drift. Full strength in all extremities. Sensation intact throughout. No dysmetria with finger-nose-finger testing. Gait has is normal-based.  Psych: " Normal mood and affect. Behavior is normal.      Labs:  11/30/2023  Free T4: 0.92     Imaging:  We reviewed the imaging from 11/10/23 and compared it to previous scans which demonstrates a pituitary cyst within the adenohypophysis today measuring 13.1 x 11.2 x 15.6 mm (craniocaudal by AP by transverse), previously 12.5 x 12.1 x 13.2 mm. Mass effect on the optic chiasm is slightly more pronounced.      Assessment:  Pituitary cyst, slightly increased compared to CT from 2018     Plan:  We reviewed the patient's history, imaging, natural history and expected outcomes of conservative management and intervention. Options include continued observation or surgery. We discussed that her pitutiary cyst likely represents either a rathke cyst or a pituitary adenoma that is cystic, with other possibilities being less likely. Options for management include observation or surgery to remove or fenestrate the cyst.  She is not having vision symptoms and there is some concern for prominent Meckel's cave spaces that may reflect intracranial hypertension, so we discussed how this might make a CSF leak with endonasal treatment of the cyst more possible and more challenging to treat.   In this case, since she is asymptomatic, I would recommend following the cyst. Rathke cysts on occasion will fluctuate in size, so observation with a repeat MRI in 6 months would be a reasonable strategy and she is in agreement.  Should surgery be needed, we reviewed transcranial and endonasal approaches.  We discussed the risks including bleeding, infection, neurologic injury, stroke, CSF leak, loss or changes in smell and taste, weakness, numbness, need for additional procedures.  She will follow up with Dr. Acevedo for ophthalmologic exams.  We will continue observation, for now, and obtain a repeat MRI in 6 months with a follow up visit.   I encouraged Mrs. Salazar to contact with any questions or concerns or if we may be of assistance in any way.      It  was a pleasure to participate in the care of your patient. Please feel free to contact me at any point if I can be of any assistance for Mrs. Salazar.        30 minutes spent on the date of the encounter doing chart review, review of outside records, review of test results, interpretation of tests, patient visit, documentation and discussion with other provider(s)      Scribe Disclosure:   I, EMILY SALASOOD, am serving as a scribe; to document services personally performed by Jerome Woodward MD -based on data collection and the provider's statements to me.         Again, thank you for allowing me to participate in the care of your patient.      Sincerely,    Jerome Woodward MD

## 2024-01-31 NOTE — NURSING NOTE
Is the patient currently in the state of [place]? YES    Visit mode:VIDEO    If the visit is dropped, the patient can be reconnected by: VIDEO VISIT: Text to cell phone:   Telephone Information:   Mobile [phone]       Will anyone else be joining the visit? NO  (If patient encounters technical issues they should call [phone]    How would you like to obtain your AVS? MyChart    Are changes needed to the allergy or medication list? No    Reason for visit: RECHECK    Bobbilynn Grossaint VVF

## 2024-01-31 NOTE — PROGRESS NOTES
[facility], [place] for Skull Base and Pituitary Surgery  Department of Neurosurgery    Name: Martita Salazar  : 1985  Referring provider: Dr. Zaragoza   24     Reason for visit: pituitary cyst, follow up visit    Dear Dr. Zaragoza ,    It was a pleasure to see Mrs. Salazar in the Center for Skull Base and Pituitary Surgery today, for a follow up visit. As you recall, Mrs. Salazar is a pleasant 38 year-old right-handed female who has a history of neck and ocular issues. She initially noticed symptoms of visual changes when she was pregnant with her daughter about 5 years ago. She describes constant visual changes bilaterally with blurry vision in her peripheral vision. After having her daughter her symptoms improved but they never fully resolved. Since then she has had 3 miscarriages and had her son about 2 years ago. She stopped breast feeding about a year ago but had a prolonged sensation that she needed to pump which she felt was abnormal. She started to have numbness on the left side of her mouth and tongue which lasted about a week but then resolved and have not recurred. This prompted her to obtain MRI imaging which demonstrated findings consistent with pituitary cyst. She had increased frequency in headaches in the past in her frontal region.     Today, she tells me her vision has been doing better and her headaches are not bothering her as much anymore. She has been following up with Dr. Acevedo and Dr. Crawford. From her visit with Dr. Acevedo on 23, her bilateral visual acuity was 20/20 -1 with improved visual field testing. Dr. Crawford seems satisfied with most of her lab values, she was considered for starting on levothyroxine and her thyroid function test was repeated.     Review of Systems:   Pertinent items are noted in HPI or as in patient entered ROS below, remainder of complete ROS is negative.     Medications:   oxycodone, sennakot s, dulcolax, tylenol, correctol       Allergies:  "  Azithromycin      Past Medical History:  pituitary cyst, cervicalgia, term pregnancy, recurrent pregnancy loss, shoulder dystocia in pregnancy, low lying placenta antepartum, incomplete miscarriage, acute suppurative parotitis, blood loss anemia, choroid plexus cyst, Cmkf-Xhus-Xvdteu syndrome, low serum progesterone      Past Surgical History:  ectopic pregnancy removal from fallopian tube, parotid biopsy, reduction of hemorrhoid, dilation and curettage, hysteroscopy, salpingectomy and/or oophorectomy      Family History:   COPD, diabetes, heart disease      Social History:   She lives about [place] hours west from the [place]. 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 They have two children, ages 5 and 2. They are not planning on having more children. She is a nonsmoker.       Physical Exam:   Ht 1.651 m (5' 5\")   Wt 72.6 kg (160 lb)   BMI 26.63 kg/m       General: No acute distress.   Head: No signs of trauma.    Eyes: Conjunctivae are normal.  Mouth/Throat: Oropharynx moist.  Neck: Normal range of motion.    Resp: No respiratory distress.   MSK: Moves all extremities.  No obvious deformity.  Neuro: The patient is fully oriented and quite pleasant. Speech normal. Extraocular movements are intact without nystagmus. Facial sensation is intact in V1, V2, V3 distributions. Facial nerve function is normal, rated as a House Brackmann I/VI, without synkinesis.  Palate is symmetric. Shoulders are full strength. Tongue is midline. There is no pronator drift. Full strength in all extremities. Sensation intact throughout. No dysmetria with finger-nose-finger testing. Gait has is normal-based.  Psych: Normal mood and affect. Behavior is normal.      Labs:  11/30/2023  Free T4: 0.92     Imaging:  We reviewed the imaging from 11/10/23 and compared it to previous scans which demonstrates a pituitary cyst within the adenohypophysis today measuring 13.1 x 11.2 x 15.6 mm (craniocaudal by AP by transverse), " previously 12.5 x 12.1 x 13.2 mm. Mass effect on the optic chiasm is slightly more pronounced.      Assessment:  Pituitary cyst, slightly increased compared to CT from 2018     Plan:  We reviewed the patient's history, imaging, natural history and expected outcomes of conservative management and intervention. Options include continued observation or surgery. We discussed that her pitutiary cyst likely represents either a rathke cyst or a pituitary adenoma that is cystic, with other possibilities being less likely. Options for management include observation or surgery to remove or fenestrate the cyst.  She is not having vision symptoms and there is some concern for prominent Meckel's cave spaces that may reflect intracranial hypertension, so we discussed how this might make a CSF leak with endonasal treatment of the cyst more possible and more challenging to treat.   In this case, since she is asymptomatic, I would recommend following the cyst. Rathke cysts on occasion will fluctuate in size, so observation with a repeat MRI in 6 months would be a reasonable strategy and she is in agreement.  Should surgery be needed, we reviewed transcranial and endonasal approaches.  We discussed the risks including bleeding, infection, neurologic injury, stroke, CSF leak, loss or changes in smell and taste, weakness, numbness, need for additional procedures.  She will follow up with Dr. Acevedo for ophthalmologic exams.  We will continue observation, for now, and obtain a repeat MRI in 6 months with a follow up visit.   I encouraged Mrs. Salazar to contact with any questions or concerns or if we may be of assistance in any way.      It was a pleasure to participate in the care of your patient. Please feel free to contact me at any point if I can be of any assistance for Mrs. Salazar.    Sincerely,  Jerome Woodward MD       30 minutes spent on the date of the encounter doing chart review, review of outside records, review of test  results, interpretation of tests, patient visit, documentation and discussion with other provider(s)      Scribe Disclosure:   I, EMILY JACOB, am serving as a scribe; to document services personally performed by Jerome Woodward MD -based on data collection and the provider's statements to me.     Virtual Visit Details    Type of service:  Video Visit     Originating Location (pt. Location): Home    Distant Location (provider location):  On-site  Platform used for Video Visit: Bria  Beginning Time: 8:25  End Time: 8:40

## 2024-01-31 NOTE — LETTER
[place] FOR SKULL BASE AND PITUITARY SURGERY  [facility] NEUROSURGERY CLINIC [address]  3RD FLO[address]  [facility] [address]  Phone: [phone]  Fax: [phone]          2024    RE:   Martita Salazar  [address] [facility] [address]      Dear Colleague,    Thank you for referring your patient, Martita Salazar, to the Center for Skull Base and Pituitary Surgery. Please see a copy of my visit note below.      [facility] for Skull Base and Pituitary Surgery  Department of Neurosurgery    Name: Martita Salazar  : 1985  Referring provider: Dr. Zaragoza   24     Reason for visit: pituitary cyst, follow up visit    Dear Dr. Zaragoza ,    It was a pleasure to see Mrs. Salazar in the Center for Skull Base and Pituitary Surgery today, for a follow up visit. As you recall, Mrs. Salazar is a pleasant 38 year-old right-handed female who has a history of neck and ocular issues. She initially noticed symptoms of visual changes when she was pregnant with her daughter about 5 years ago. She describes constant visual changes bilaterally with blurry vision in her peripheral vision. After having her daughter her symptoms improved but they never fully resolved. Since then she has had 3 miscarriages and had her son about 2 years ago. She stopped breast feeding about a year ago but had a prolonged sensation that she needed to pump which she felt was abnormal. She started to have numbness on the left side of her mouth and tongue which lasted about a week but then resolved and have not recurred. This prompted her to obtain MRI imaging which demonstrated findings consistent with pituitary cyst. She had increased frequency in headaches in the past in her frontal region.     Today, she tells me her vision has been doing better and her headaches are not bothering her as much anymore. She has been following up with Dr. Acevedo and Dr. Crawford. From her visit with Dr. Acevedo on  "06/12/23, her bilateral visual acuity was 20/20 -1 with improved visual field testing. Dr. Crawford seems satisfied with most of her lab values, she was considered for starting on levothyroxine and her thyroid function test was repeated.     Review of Systems:   Pertinent items are noted in HPI or as in patient entered ROS below, remainder of complete ROS is negative.     Medications:   oxycodone, sennakot s, dulcolax, tylenol, correctol       Allergies:   Azithromycin      Past Medical History:  pituitary cyst, cervicalgia, term pregnancy, recurrent pregnancy loss, shoulder dystocia in pregnancy, low lying placenta antepartum, incomplete miscarriage, acute suppurative parotitis, blood loss anemia, choroid plexus cyst, Aisk-Ypyz-Uousyv syndrome, low serum progesterone      Past Surgical History:  ectopic pregnancy removal from fallopian tube, parotid biopsy, reduction of hemorrhoid, dilation and curettage, hysteroscopy, salpingectomy and/or oophorectomy      Family History:   COPD, diabetes, heart disease      Social History:   She lives about [place] hours west from the [place]. 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 They have two children, ages 5 and 2. They are not planning on having more children. She is a nonsmoker.       Physical Exam:   Ht 1.651 m (5' 5\")   Wt 72.6 kg (160 lb)   BMI 26.63 kg/m       General: No acute distress.   Head: No signs of trauma.    Eyes: Conjunctivae are normal.  Mouth/Throat: Oropharynx moist.  Neck: Normal range of motion.    Resp: No respiratory distress.   MSK: Moves all extremities.  No obvious deformity.  Neuro: The patient is fully oriented and quite pleasant. Speech normal. Extraocular movements are intact without nystagmus. Facial sensation is intact in V1, V2, V3 distributions. Facial nerve function is normal, rated as a House Brackmann I/VI, without synkinesis.  Palate is symmetric. Shoulders are full strength. Tongue is midline. There is no pronator " drift. Full strength in all extremities. Sensation intact throughout. No dysmetria with finger-nose-finger testing. Gait has is normal-based.  Psych: Normal mood and affect. Behavior is normal.      Labs:  11/30/2023  Free T4: 0.92     Imaging:  We reviewed the imaging from 11/10/23 and compared it to previous scans which demonstrates a pituitary cyst within the adenohypophysis today measuring 13.1 x 11.2 x 15.6 mm (craniocaudal by AP by transverse), previously 12.5 x 12.1 x 13.2 mm. Mass effect on the optic chiasm is slightly more pronounced.      Assessment:  Pituitary cyst, slightly increased compared to CT from 2018     Plan:  We reviewed the patient's history, imaging, natural history and expected outcomes of conservative management and intervention. Options include continued observation or surgery. We discussed that her pitutiary cyst likely represents either a rathke cyst or a pituitary adenoma that is cystic, with other possibilities being less likely. Options for management include observation or surgery to remove or fenestrate the cyst.  She is not having vision symptoms and there is some concern for prominent Meckel's cave spaces that may reflect intracranial hypertension, so we discussed how this might make a CSF leak with endonasal treatment of the cyst more possible and more challenging to treat.   In this case, since she is asymptomatic, I would recommend following the cyst. Rathke cysts on occasion will fluctuate in size, so observation with a repeat MRI in 6 months would be a reasonable strategy and she is in agreement.  Should surgery be needed, we reviewed transcranial and endonasal approaches.  We discussed the risks including bleeding, infection, neurologic injury, stroke, CSF leak, loss or changes in smell and taste, weakness, numbness, need for additional procedures.  She will follow up with Dr. Acevedo for ophthalmologic exams.  We will continue observation, for now, and obtain a repeat MRI in 6  months with a follow up visit.   I encouraged Mrs. Salazar to contact with any questions or concerns or if we may be of assistance in any way.      It was a pleasure to participate in the care of your patient. Please feel free to contact me at any point if I can be of any assistance for Mrs. Salazar.    Sincerely,  Jerome Woodward MD       30 minutes spent on the date of the encounter doing chart review, review of outside records, review of test results, interpretation of tests, patient visit, documentation and discussion with other provider(s)      Scribe Disclosure:   I, CUAUHTEMOCIJEOMA CECIL, am serving as a scribe; to document services personally performed by Jerome Woodward MD -based on data collection and the provider's statements to me.     Virtual Visit Details    Type of service:  Video Visit     Originating Location (pt. Location): Home    Distant Location (provider location):  On-site  Platform used for Video Visit: Bria  Beginning Time: 8:25  End Time: 8:40      Again, thank you for allowing me to participate in the care of your patient.      Sincerely,    Jerome Woodward MD

## 2024-02-12 ENCOUNTER — TELEPHONE (OUTPATIENT)
Dept: NEUROSURGERY | Facility: CLINIC | Age: 39
End: 2024-02-12
Payer: COMMERCIAL

## 2024-02-12 NOTE — TELEPHONE ENCOUNTER
Left Voicemail (1st Attempt) and Sent Mychart (1st Attempt) for the patient to call back and schedule the following:    Appointment type: Return Skull Base Neurosurg  Provider: Vilma Guy PA-C  Return date: Around 7/31/2024  Specialty phone number: [phone]  Additional appointment(s) needed: MR Brain prior  Additional Notes: N/A    Ha Jacinto on 2/12/2024 at 2:46 PM

## 2024-02-14 ENCOUNTER — TELEPHONE (OUTPATIENT)
Dept: NEUROSURGERY | Facility: CLINIC | Age: 39
End: 2024-02-14
Payer: COMMERCIAL

## 2024-02-14 NOTE — TELEPHONE ENCOUNTER
Patient Contacted for the patient to call back and schedule the following:    Appointment type: Return Skull Base Neurosurg  Provider: Vilma Guy PA-C  Return date: Around 7/31/2024  Specialty phone number: [phone]  Additional appointment(s) needed: MR Brain prior  Additional Notes: N/A    Spoke with patient, she stated she is having her MRI outside [place], closer to home. Will call back to schedule follow-up once MRI is set up.    Ha Jacinto on 2/14/2024 at 11:57 AM

## 2024-02-25 ENCOUNTER — HEALTH MAINTENANCE LETTER (OUTPATIENT)
Age: 39
End: 2024-02-25

## 2024-03-27 ENCOUNTER — OFFICE VISIT (OUTPATIENT)
Dept: OPHTHALMOLOGY | Facility: CLINIC | Age: 39
End: 2024-03-27
Payer: COMMERCIAL

## 2024-03-27 DIAGNOSIS — H53.40 VISUAL FIELD DEFECT: Primary | ICD-10-CM

## 2024-03-27 DIAGNOSIS — H53.462 HEMIANOPIA OF LEFT EYE: Primary | ICD-10-CM

## 2024-03-27 DIAGNOSIS — E23.6 PITUITARY CYST (H): ICD-10-CM

## 2024-03-27 PROCEDURE — 92133 CPTRZD OPH DX IMG PST SGM ON: CPT | Performed by: OPHTHALMOLOGY

## 2024-03-27 PROCEDURE — 92083 EXTENDED VISUAL FIELD XM: CPT | Performed by: OPHTHALMOLOGY

## 2024-03-27 PROCEDURE — 92014 COMPRE OPH EXAM EST PT 1/>: CPT | Performed by: OPHTHALMOLOGY

## 2024-03-27 ASSESSMENT — CONF VISUAL FIELD
OS_SUPERIOR_TEMPORAL_RESTRICTION: 0
OD_NORMAL: 1
OS_INFERIOR_TEMPORAL_RESTRICTION: 0
OS_INFERIOR_NASAL_RESTRICTION: 0
OD_INFERIOR_NASAL_RESTRICTION: 0
OD_INFERIOR_TEMPORAL_RESTRICTION: 0
OD_SUPERIOR_NASAL_RESTRICTION: 0
METHOD: COUNTING FINGERS
OS_NORMAL: 1
OD_SUPERIOR_TEMPORAL_RESTRICTION: 0
OS_SUPERIOR_NASAL_RESTRICTION: 0

## 2024-03-27 ASSESSMENT — TONOMETRY
OS_IOP_MMHG: 10
OD_IOP_MMHG: 10
IOP_METHOD: ICARE

## 2024-03-27 ASSESSMENT — VISUAL ACUITY
METHOD: SNELLEN - LINEAR
OD_SC: 20/20
OS_SC: 20/20

## 2024-03-27 ASSESSMENT — CUP TO DISC RATIO
OD_RATIO: 0.45
OS_RATIO: 0.45

## 2024-03-27 ASSESSMENT — EXTERNAL EXAM - LEFT EYE: OS_EXAM: NORMAL

## 2024-03-27 ASSESSMENT — SLIT LAMP EXAM - LIDS
COMMENTS: NORMAL
COMMENTS: NORMAL

## 2024-03-27 ASSESSMENT — EXTERNAL EXAM - RIGHT EYE: OD_EXAM: NORMAL

## 2024-03-27 NOTE — PROGRESS NOTES
Assessment & Plan     Martita Salazar is a 38 year old female with the following diagnoses:   1. Hemianopia of left eye    2. Pituitary cyst (H24)       Follow up pituitary cyst.  Last visit was June 2023.  At that time, she did not have a compressive optic neuropathy.  Her most recent MRI was in November,  and this showed some interval progression in size.  There was more mass effect on the chiasm.      Her visual acuity is 20/20 both eyes, her color vision is normal, her pupils are normal.  She has a temporal visual field defect in the left eye that respects the vertical meridian.  There is no such visual field defect in the right eye.  This temporal visual field defect is worse than her previous visit in June 2023 it is also worse than her visit in January 2023.  It is unclear whether this represents a result of the chiasmal compression.  I will see her again in 4 months sooner as needed for worsening symptoms.               Attending Physician Attestation:  Complete documentation of historical and exam elements from today's encounter can be found in the full encounter summary report (not reduplicated in this progress note).  I personally obtained the chief complaint(s) and history of present illness.  I confirmed and edited as necessary the review of systems, past medical/surgical history, family history, social history, and examination findings as documented by others; and I examined the patient myself.  I personally reviewed the relevant tests, images, and reports as documented above.  I formulated and edited as necessary the assessment and plan and discussed the findings and management plan with the patient and family. - Jc Acevedo MD

## 2024-06-10 ENCOUNTER — CARE COORDINATION (OUTPATIENT)
Dept: NEUROSURGERY | Facility: CLINIC | Age: 39
End: 2024-06-10
Payer: COMMERCIAL

## 2024-06-10 NOTE — PROGRESS NOTES
Writer obtained patient imaging from [facility] MRI Brain 06/05/2024, Resolved in Pacs, Report Verified in Care Everywhere.     Michaelle Jessica LPN  Neurosurgery

## 2024-06-11 ENCOUNTER — VIRTUAL VISIT (OUTPATIENT)
Dept: NEUROSURGERY | Facility: CLINIC | Age: 39
End: 2024-06-11
Payer: COMMERCIAL

## 2024-06-11 VITALS — HEIGHT: 65 IN | BODY MASS INDEX: 27.32 KG/M2 | WEIGHT: 164 LBS

## 2024-06-11 DIAGNOSIS — E23.6 PITUITARY CYST (H): Primary | ICD-10-CM

## 2024-06-11 PROCEDURE — 99213 OFFICE O/P EST LOW 20 MIN: CPT | Mod: 95 | Performed by: PHYSICIAN ASSISTANT

## 2024-06-11 ASSESSMENT — PAIN SCALES - GENERAL: PAINLEVEL: NO PAIN (0)

## 2024-06-11 NOTE — LETTER
"2024       RE: Martita Salazar  [id number] [facility] [address]       Dear Colleague,    Thank you for referring your patient, Martita Salazar, to the [facility] NEUROSURGERY CLINIC [place] [facility]. Please see a copy of my visit note below.    [facility]  Department of Neurosurgery  Center for Skull Base and Pituitary Surgery    Name: Martita Salazar  MRN: [id number]  Age: 38 year old  : 1985  2024      Chief Complaint:   Pituitary cyst, follow up visit    History of Present Illness:   Martita Salazar is a 38 year old right handed female who is seen today by video visit for follow up regarding a pituitary cyst, found in  on workup for left sided facial numbness which ultimately resolved spontaneously. She had also reported ongoing peripheral vision changes with blurriness bilaterally ongoing for the previous 5 years, since her first pregnancy. We've been following her, at her last visit with Dr. Woodward in January this year they again reviewed options for management and settled on close follow up. Since then, she had an exam with Dr. Acevedo in NeuroOphthalmology 3/27/2024 which revealed a left temporal visual field defect which was worse than previous exam 2023. Martita does not notice any new blurry or double vision, specifically has not noticed peripheral vision deficit. She has had an episode of \"phantom smells\" recently. No new headaches or other concerns. She has a close follow up with Dr. Acevedo scheduled for 2024.    Review of Systems:   Pertinent items are noted in HPI or as in patient entered ROS below, remainder of complete ROS is negative.     Physical Exam:   Ht 1.651 m (5' 5\")   Wt 74.4 kg (164 lb)   BMI 27.29 kg/m     Exam today is limited due to video visit. Speech is normal. Face appears symmetric.     Imaging:  We reviewed her MRI done 2024 today by video which shows a largely stable " sized cystic sellar lesion with tenting of the optic chiasm centrally.      Assessment:  Pituitary cyst, follow up visit    Plan:  Discussed with Dr. Woodward. We will await Martita's upcoming neuroOphthalmology exam results to decide definitively on next steps. Ideally, we would wait on surgical intervention, though if she has ongoing visual deficit corresponding to her pituitary lesion, will need to discuss surgery.         Again, thank you for allowing me to participate in the care of your patient.      Sincerely,    Vilma Guy PA-C

## 2024-06-11 NOTE — PROGRESS NOTES
"Virtual Visit Details    Type of service:  Video Visit     Originating Location (pt. Location): Home    Distant Location (provider location):  Off-site  Platform used for Video Visit: Ignition[employer]  Video start time 9:02A  End time 9:15A      [facility]  Department of Neurosurgery  Center for Skull Base and Pituitary Surgery    Name: Martita Salazar  MRN: [id number]  Age: 38 year old  : 1985  2024      Chief Complaint:   Pituitary cyst, follow up visit    History of Present Illness:   Martita Salazar is a 38 year old right handed female who is seen today by video visit for follow up regarding a pituitary cyst, found in  on workup for left sided facial numbness which ultimately resolved spontaneously. She had also reported ongoing peripheral vision changes with blurriness bilaterally ongoing for the previous 5 years, since her first pregnancy. We've been following her, at her last visit with Dr. Woodward in January this year they again reviewed options for management and settled on close follow up. Since then, she had an exam with Dr. Acevedo in NeuroOphthalmology 3/27/2024 which revealed a left temporal visual field defect which was worse than previous exam 2023. Martita does not notice any new blurry or double vision, specifically has not noticed peripheral vision deficit. She has had an episode of \"phantom smells\" recently. No new headaches or other concerns. She has a close follow up with Dr. Acevedo scheduled for 2024.    Review of Systems:   Pertinent items are noted in HPI or as in patient entered ROS below, remainder of complete ROS is negative.     Physical Exam:   Ht 1.651 m (5' 5\")   Wt 74.4 kg (164 lb)   BMI 27.29 kg/m     Exam today is limited due to video visit. Speech is normal. Face appears symmetric.     Imaging:  We reviewed her MRI done 2024 today by video which shows a largely stable sized cystic sellar lesion with tenting of the optic chiasm centrally.    "   Assessment:  Pituitary cyst, follow up visit    Plan:  Discussed with Dr. Woodward. We will await Martita's upcoming neuroOphthalmology exam results to decide definitively on next steps. Ideally, we would wait on surgical intervention, though if she has ongoing visual deficit corresponding to her pituitary lesion, will need to discuss surgery.       Vilma Guy PA-C  Department of Neurosurgery

## 2024-06-11 NOTE — NURSING NOTE
Is the patient currently in the state of [place]? YES    Visit mode:VIDEO    If the visit is dropped, the patient can be reconnected by: VIDEO VISIT: Text to cell phone:   Telephone Information:   Mobile [phone]       Will anyone else be joining the visit? NO  (If patient encounters technical issues they should call [phone]    How would you like to obtain your AVS? MyChart    Are changes needed to the allergy or medication list? No    Are refills needed on medications prescribed by this physician? NO    Reason for visit: RECHECK    Bobbilynn Grossaint VVF

## 2024-06-14 ENCOUNTER — MYC MEDICAL ADVICE (OUTPATIENT)
Dept: ENDOCRINOLOGY | Facility: CLINIC | Age: 39
End: 2024-06-14
Payer: COMMERCIAL

## 2024-06-14 DIAGNOSIS — D35.2 PITUITARY ADENOMA (H): Primary | ICD-10-CM

## 2024-07-17 ENCOUNTER — OFFICE VISIT (OUTPATIENT)
Dept: OPHTHALMOLOGY | Facility: CLINIC | Age: 39
End: 2024-07-17
Payer: COMMERCIAL

## 2024-07-17 DIAGNOSIS — H53.40 VISUAL FIELD DEFECT: Primary | ICD-10-CM

## 2024-07-17 DIAGNOSIS — E23.6 PITUITARY CYST (H): Primary | ICD-10-CM

## 2024-07-17 DIAGNOSIS — H53.40 VISUAL FIELD DEFECT: ICD-10-CM

## 2024-07-17 PROCEDURE — 92133 CPTRZD OPH DX IMG PST SGM ON: CPT | Performed by: OPHTHALMOLOGY

## 2024-07-17 PROCEDURE — 92014 COMPRE OPH EXAM EST PT 1/>: CPT | Performed by: OPHTHALMOLOGY

## 2024-07-17 PROCEDURE — 92083 EXTENDED VISUAL FIELD XM: CPT | Performed by: OPHTHALMOLOGY

## 2024-07-17 ASSESSMENT — TONOMETRY
OS_IOP_MMHG: 9
IOP_METHOD: ICARE
OD_IOP_MMHG: 11

## 2024-07-17 ASSESSMENT — CONF VISUAL FIELD
OS_NORMAL: 1
OD_NORMAL: 1
OS_INFERIOR_TEMPORAL_RESTRICTION: 0
OD_INFERIOR_NASAL_RESTRICTION: 0
OD_SUPERIOR_NASAL_RESTRICTION: 0
METHOD: COUNTING FINGERS
OD_SUPERIOR_TEMPORAL_RESTRICTION: 0
OS_SUPERIOR_NASAL_RESTRICTION: 0
OS_INFERIOR_NASAL_RESTRICTION: 0
OD_INFERIOR_TEMPORAL_RESTRICTION: 0
OS_SUPERIOR_TEMPORAL_RESTRICTION: 0

## 2024-07-17 ASSESSMENT — EXTERNAL EXAM - LEFT EYE: OS_EXAM: NORMAL

## 2024-07-17 ASSESSMENT — SLIT LAMP EXAM - LIDS
COMMENTS: NORMAL
COMMENTS: NORMAL

## 2024-07-17 ASSESSMENT — VISUAL ACUITY
OS_SC: 20/25
METHOD: SNELLEN - LINEAR
OS_PH_SC: 20/20
OD_SC: 20/20

## 2024-07-17 ASSESSMENT — CUP TO DISC RATIO
OS_RATIO: 0.45
OD_RATIO: 0.45

## 2024-07-17 ASSESSMENT — EXTERNAL EXAM - RIGHT EYE: OD_EXAM: NORMAL

## 2024-07-17 NOTE — NURSING NOTE
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       Chief Complaint(s) and History of Present Illness(es)       Follow Up              Onset: 3.5 years ago    Course: stable    Response to treatment: no improvement    Comments: Hemianopia left eye in the setting of pituitary cyst.                Comments    Vision stable. Denies eye pain, flashes or floaters.      Danyell Jimenez on 7/17/2024 at 9:36 AM

## 2024-07-17 NOTE — LETTER
2024     RE:  :  MRN: Martita Salazar  1985  [id number]     Dear Dr. Woodward:    Your patient,Martita Salazar, returned for neuro-ophthalmic follow up. My assessment and plan are below.  For further details, please see my attached clinic note.      Assessment & Plan     Martita Salazar is a 38 year old female with the following diagnoses:   1. Pituitary cyst (H24)    2. Visual field defect       Follow up temporal visual field defect in the LEFT eye that respected the vertical. She also has a pituitary  cyst. She had an MRI in  which showed no changes.  She denies any changes to her vision. She does not feel like her LEFT eye has any visual changes compared to the RIGHT eye.      Her visual acuity is 20/20 right eye without correction and 20/20 left eye with pinhole.  Her visual field today shows resolution of the temporal visual field defect in the left eye.  The right eye shows a nasal paracentral defect.  I reviewed her MRI and this shows no changes.  There is slight distortion of the optic chiasm.    It is my impression that she has a visual field defect in the right eye today.  It is not consistent with chiasmal compression and is located on the nasal side.  It was not present previously in the right eye and the previous temporal visual field defect in the left eye is now gone.  I believe that she most likely has visual field artifacts and that these are not real visual field defects.  I would recommend she follow-up in 1 year sooner as needed for worsening symptoms.            Again, thank you for allowing me to participate in the care of your patient.      Sincerely,      Jc Acevedo MD  Professor  Director of Neuro-Ophthalmology  [facility]  Departments of Ophthalmology, Neurology, and Neurosurgery  [facility] [address] [place]  [address]  T - [phone]  F - [phone]  [email or web address]      CC: Jerome Vargas  MD Crissy  [address] [facility] [address]  Via In Basket

## 2024-07-17 NOTE — PROGRESS NOTES
Assessment & Plan     Martita Salazar is a 38 year old female with the following diagnoses:   1. Pituitary cyst (H24)    2. Visual field defect       Follow up temporal visual field defect in the LEFT eye that respected the vertical. She also has a pituitary  cyst. She had an MRI in June which showed no changes.  She denies any changes to her vision. She does not feel like her LEFT eye has any visual changes compared to the RIGHT eye.      Her visual acuity is 20/20 right eye without correction and 20/20 left eye with pinhole.  Her visual field today shows resolution of the temporal visual field defect in the left eye.  The right eye shows a nasal paracentral defect.  I reviewed her MRI and this shows no changes.  There is slight distortion of the optic chiasm.    It is my impression that she has a visual field defect in the right eye today.  It is not consistent with chiasmal compression and is located on the nasal side.  It was not present previously in the right eye and the previous temporal visual field defect in the left eye is now gone.  I believe that she most likely has visual field artifacts and that these are not real visual field defects.  I would recommend she follow-up in 1 year sooner as needed for worsening symptoms.             Attending Physician Attestation:  Complete documentation of historical and exam elements from today's encounter can be found in the full encounter summary report (not reduplicated in this progress note).  I personally obtained the chief complaint(s) and history of present illness.  I confirmed and edited as necessary the review of systems, past medical/surgical history, family history, social history, and examination findings as documented by others; and I examined the patient myself.  I personally reviewed the relevant tests, images, and reports as documented above.  I formulated and edited as necessary the assessment and plan and discussed the findings and management  plan with the patient and family. - Jc Acevedo MD

## 2024-08-07 ENCOUNTER — VIRTUAL VISIT (OUTPATIENT)
Dept: ENDOCRINOLOGY | Facility: CLINIC | Age: 39
End: 2024-08-07
Payer: COMMERCIAL

## 2024-08-07 VITALS — BODY MASS INDEX: 26.63 KG/M2 | WEIGHT: 160 LBS

## 2024-08-07 DIAGNOSIS — E23.6 PITUITARY CYST (H): Primary | ICD-10-CM

## 2024-08-07 PROCEDURE — 99214 OFFICE O/P EST MOD 30 MIN: CPT | Mod: 95 | Performed by: INTERNAL MEDICINE

## 2024-08-07 NOTE — NURSING NOTE
Current patient location:  [place]    Is the patient currently in the state of [place]? YES    Visit mode:VIDEO    If the visit is dropped, the patient can be reconnected by: VIDEO VISIT: Text to cell phone:   Telephone Information:   Mobile [phone]       Will anyone else be joining the visit? NO  (If patient encounters technical issues they should call [phone] :[phone])    How would you like to obtain your AVS? MyChart    Are changes needed to the allergy or medication list? No    Are refills needed on medications prescribed by this physician? NO    Rooming Documentation:  Questionnaire(s) not pre-assigned      Reason for visit: Video Visit and RECHECK    Jessika ZARAGOZA

## 2024-08-07 NOTE — LETTER
8/7/2024       RE: Martita Salazar  [id number] [facility] [address]     Dear Colleague,    Thank you for referring your patient, Martita Salazar, to the [facility] ENDOCRINOLOGY CLINIC [place] at [facility]. Please see a copy of my visit note below.      Video visit  Start 8:40 am  End 8:55 am  [place]                                                                               - Endocrinology Follow up -    Reason for visit/consult:  Pituitary cyst (H)    Primary care provider: William Zaragoza        Assessment and Plan  A 38 year old female with pituitary cystic lesion,     Pituitary cystic lesion - under observation currently   Followed up by Dr. Woodward and Dr. Acevedo    - agree with 1 year follow up MRI (last one 6/5/2024 reviewed)     GH deficiency   It was 53 in 12/2022 then normalized after that, most recent IGF1 98 Z -0.67)    Other hormones  Free T4 1.02, PRL 16 in 11/2023      RTC with me in 1 year      Total 30 minutes spent on the date of the encounter doing chart review, history and exam, documentation and further activities as noted above.        Katelyn Crawford MD  Staff Physician  Endocrinology and Metabolism  License: [id number]    Interval History as of 8/7/2024 : Patient has been doing well. No hormone therapy, menstrual cycle 25-27 days. Recently seen by neuro ophthalmologist and stable, no compression.   Interval History as of 6/21/2023 : Patient has been doing well and feeling better for the past 3 months. New event: no pertinent medical event noted.   HPI: A 36 yo female here for the endocrine evaluation for her pituitary cystic lesion.  Her pituitary cystic lesion was incidentally found in October 2022 when she had numbness on the left side of her tongue she underwent to MRI and found out cystic lesion.  She was seen by  and pituitary function test was done which showed TSH 1.19, free T4 1.26 normal thyroid function,  prolactin 15, a.m. cortisol 12.9 her IGF-I was low 54.   Currently she mentioned occasional light sensitivity, increased sensitivity for the sense of smell, occasional nausea, she still has some feeling of leaking of the breastmilk.  She had childbirth 2017 and 2020 and currently not breast-feeding for the past 1 year however she still have some sensation.   Her menarche age 10 and has been regular menstrual cycle however after the second birth of the child her menstrual cycle has been irregular range from 21 to 31 days.  No other significant medical history noted.  Family history including diabetes in her family.     Past Medical/Surgical History:  Past Medical History:   Diagnosis Date     Allergic rhinitis      Past Surgical History:   Procedure Laterality Date     GYN SURGERY       PAROTIDECTOMY Right 08/08/2018    Procedure: PAROTIDECTOMY;  Right Parotidectomy;  Surgeon: Orlando Viera MD;  Location:  OR       Allergies:  Allergies   Allergen Reactions     Azithromycin Nausea and Vomiting       Current Medications   No current outpatient medications on file.     No current facility-administered medications for this visit.       Family History:  Family History   Problem Relation Age of Onset     Strabismus No family hx of      Amblyopia No family hx of        Social History:  Social History     Tobacco Use     Smoking status: Never     Smokeless tobacco: Never   Substance Use Topics     Alcohol use: Yes       ROS:  Full review of systems taken with the help of the intake sheet. Otherwise a complete 14 point review of systems was taken and is negative unless stated in the history above.      Physical Exam:   Vitals: Wt 72.6 kg (160 lb)   BMI 26.63 kg/m    BMI= Body mass index is 26.63 kg/m .   General: well appearing, no acute distress, pleasant and conversant,   Mental Status/neuro: alert and oriented  Face: symmetrical, normal facial color  Eyes: anicteric,  no proptosis or lid lag  Resp: normally  breathing      Labs : I reviewed data from epic and extract and summarize the pertinent data here.   Lab Results   Component Value Date     11/16/2022      Lab Results   Component Value Date    POTASSIUM 4.3 11/16/2022     Lab Results   Component Value Date    CHLORIDE 106 11/16/2022     Lab Results   Component Value Date    SIERRA 8.9 11/16/2022     Lab Results   Component Value Date    CO2 25 11/16/2022     Lab Results   Component Value Date    BUN 11.8 11/16/2022     Lab Results   Component Value Date    CR 0.65 11/16/2022     Lab Results   Component Value Date    GLC 98 11/16/2022     Lab Results   Component Value Date    TSH 1.19 11/16/2022     Lab Results   Component Value Date    T4 1.26 11/16/2022     No results found for: A1C    No results found for: IGF1  No results found for: LH  Lab Results   Component Value Date    FSH 5.3 11/16/2022     No results found for: ESTROGEN  No results found for: PROLACTIN        MRI Brain: I personally reviewed the original images and agree with the below reports.     11/2022          Again, thank you for allowing me to participate in the care of your patient.      Sincerely,    Katelyn Crawford MD

## 2024-08-07 NOTE — PROGRESS NOTES
Video visit  Start 8:40 am  End 8:55 am  [facility]                                                                               - Endocrinology Follow up -    Reason for visit/consult:  Pituitary cyst (H)    Primary care provider: William Zaragoza        Assessment and Plan  A 38 year old female with pituitary cystic lesion,     Pituitary cystic lesion - under observation currently   Followed up by Dr. Woodward and Dr. Acevedo    - agree with 1 year follow up MRI (last one 6/5/2024 reviewed)     GH deficiency   It was 53 in 12/2022 then normalized after that, most recent IGF1 98 Z -0.67)    Other hormones  Free T4 1.02, PRL 16 in 11/2023      RTC with me in 1 year      Total 30 minutes spent on the date of the encounter doing chart review, history and exam, documentation and further activities as noted above.        Katelyn Crawford MD  Staff Physician  Endocrinology and Metabolism  License: [id number]    Interval History as of 8/7/2024 : Patient has been doing well. No hormone therapy, menstrual cycle 25-27 days. Recently seen by neuro ophthalmologist and stable, no compression.   Interval History as of 6/21/2023 : Patient has been doing well and feeling better for the past 3 months. New event: no pertinent medical event noted.   HPI: A 38 yo female here for the endocrine evaluation for her pituitary cystic lesion.  Her pituitary cystic lesion was incidentally found in October 2022 when she had numbness on the left side of her tongue she underwent to MRI and found out cystic lesion.  She was seen by  and pituitary function test was done which showed TSH 1.19, free T4 1.26 normal thyroid function, prolactin 15, a.m. cortisol 12.9 her IGF-I was low 54.   Currently she mentioned occasional light sensitivity, increased sensitivity for the sense of smell, occasional nausea, she still has some feeling of leaking of the breastmilk.  She had childbirth 2017 and 2020 and currently not breast-feeding for the past 1 year  however she still have some sensation.   Her menarche age 10 and has been regular menstrual cycle however after the second birth of the child her menstrual cycle has been irregular range from 21 to 31 days.  No other significant medical history noted.  Family history including diabetes in her family.     Past Medical/Surgical History:  Past Medical History:   Diagnosis Date    Allergic rhinitis      Past Surgical History:   Procedure Laterality Date    GYN SURGERY      PAROTIDECTOMY Right 08/08/2018    Procedure: PAROTIDECTOMY;  Right Parotidectomy;  Surgeon: Orlando Viera MD;  Location:  OR       Allergies:  Allergies   Allergen Reactions    Azithromycin Nausea and Vomiting       Current Medications   No current outpatient medications on file.     No current facility-administered medications for this visit.       Family History:  Family History   Problem Relation Age of Onset    Strabismus No family hx of     Amblyopia No family hx of        Social History:  Social History     Tobacco Use    Smoking status: Never    Smokeless tobacco: Never   Substance Use Topics    Alcohol use: Yes       ROS:  Full review of systems taken with the help of the intake sheet. Otherwise a complete 14 point review of systems was taken and is negative unless stated in the history above.      Physical Exam:   Vitals: Wt 72.6 kg (160 lb)   BMI 26.63 kg/m    BMI= Body mass index is 26.63 kg/m .   General: well appearing, no acute distress, pleasant and conversant,   Mental Status/neuro: alert and oriented  Face: symmetrical, normal facial color  Eyes: anicteric,  no proptosis or lid lag  Resp: normally breathing      Labs : I reviewed data from epic and extract and summarize the pertinent data here.   Lab Results   Component Value Date     11/16/2022      Lab Results   Component Value Date    POTASSIUM 4.3 11/16/2022     Lab Results   Component Value Date    CHLORIDE 106 11/16/2022     Lab Results   Component Value Date     SIERRA 8.9 11/16/2022     Lab Results   Component Value Date    CO2 25 11/16/2022     Lab Results   Component Value Date    BUN 11.8 11/16/2022     Lab Results   Component Value Date    CR 0.65 11/16/2022     Lab Results   Component Value Date    GLC 98 11/16/2022     Lab Results   Component Value Date    TSH 1.19 11/16/2022     Lab Results   Component Value Date    T4 1.26 11/16/2022     No results found for: A1C    No results found for: IGF1  No results found for: LH  Lab Results   Component Value Date    FSH 5.3 11/16/2022     No results found for: ESTROGEN  No results found for: PROLACTIN        MRI Brain: I personally reviewed the original images and agree with the below reports.     11/2022

## 2024-08-29 ENCOUNTER — TELEPHONE (OUTPATIENT)
Dept: ENDOCRINOLOGY | Facility: CLINIC | Age: 39
End: 2024-08-29
Payer: COMMERCIAL

## 2024-08-29 NOTE — TELEPHONE ENCOUNTER
Left Voicemail (1st Attempt) and Sent Mychart (1st Attempt) for the patient to call back and schedule the following:    Appointment type: One Year Follow Up  Provider: Dr. Crawford  Return date: August 2025  Specialty phone number: [phone]  Additional appointment(s) needed:   Additonal Notes:

## 2024-09-03 NOTE — TELEPHONE ENCOUNTER
Left Voicemail (2nd Attempt) for the patient to call back and schedule the following:     Appointment type: One Year Follow Up  Provider: Dr. Crawford  Return date: August 2025  Specialty phone number: [phone]  Additional appointment(s) needed:   Additonal Notes:

## 2024-12-01 ENCOUNTER — HEALTH MAINTENANCE LETTER (OUTPATIENT)
Age: 39
End: 2024-12-01

## 2025-03-09 ENCOUNTER — HEALTH MAINTENANCE LETTER (OUTPATIENT)
Age: 40
End: 2025-03-09

## 2025-06-06 NOTE — PROGRESS NOTES
HISTORY OF PRESENT ILLNESS:  Martita Salazar returns to clinic status post right superficial parotidectomy with monitoring of the facial nerve.  She had what appeared to be a benign mass easily removed, with full function of the facial nerve retained.  She is here today for postoperative evaluation and suture removal.  She notes some mild discomfort.  This is a rapidly resolving but no obvious infection or cellulitis to the site.      PHYSICAL EXAMINATION:  Her examination is consistent with that.  Sutures were removed.      Interestingly enough, examination of her external auditory canals just shows mild hyphae within, for which she was placed on antifungal drops preoperatively.      ASSESSMENT:  Stable, status post superficial parotidectomy on the right with nerve preservation. Full pathological results are pending.      PLAN:  She will continue the antifungal drops to the ear.  She will place Aquaphor to the incision site.  She will follow up with me as needed.  She can resume all normal activity including washing hair, face and exercise.      DDH/ms        Please advise. Patient is requesting oxycodone sent to her [facility] [place]

## 2025-06-24 ENCOUNTER — DOCUMENTATION ONLY (OUTPATIENT)
Dept: NEUROSURGERY | Facility: CLINIC | Age: 40
End: 2025-06-24
Payer: COMMERCIAL

## 2025-06-24 NOTE — PROGRESS NOTES
Neuroscience Clinic Task Note    TASK    Return Records - Neurosurgery  Records received from: [facility]    Reason for visit:    Provider: Dr. Woodward    Date of appt:     NOTES  (For all visits) STATUS DETAILS   OFFICE NOTE   from referring provider N/A    OFFICE NOTE   from other specialist N/A    DISCHARGE SUMMARY   from hospital N/A    DISCHARGE REPORT   from ER N/A    Operative Report N/A    EMG Report N/A    Medication List N/A    IMAGING  (For all visits)     MRI (head, neck, spine) In Pacs/Care Everywhere  06/23/2025 MRI Pituitary  [facility]   in Pacs/Report in Care Everywhere    XRAY (spine)   *NEUROSURGERY* N/A    CT (head, neck, spine) N/A        FOLLOW-UP       ADDITIONAL COMMENTS       Edda Jessica LPN

## 2025-08-06 ENCOUNTER — OFFICE VISIT (OUTPATIENT)
Dept: OPHTHALMOLOGY | Facility: CLINIC | Age: 40
End: 2025-08-06
Payer: COMMERCIAL

## 2025-08-06 DIAGNOSIS — E23.6 PITUITARY CYST: Primary | ICD-10-CM

## 2025-08-06 DIAGNOSIS — H47.393 OTHER DISORDERS OF OPTIC DISC, BILATERAL: ICD-10-CM

## 2025-08-06 LAB
OPHTH MEAN DEVIATION LEFT EYE: -1 DB
OPHTH MEAN DEVIATION RIGHT EYE: 0.3 DB

## 2025-08-06 RX ORDER — ACETAMINOPHEN 500 MG
1000 TABLET ORAL
COMMUNITY

## 2025-08-06 ASSESSMENT — CONF VISUAL FIELD
OS_SUPERIOR_NASAL_RESTRICTION: 0
METHOD: COUNTING FINGERS
OD_NORMAL: 1
OS_SUPERIOR_TEMPORAL_RESTRICTION: 0
OD_SUPERIOR_TEMPORAL_RESTRICTION: 0
OD_SUPERIOR_NASAL_RESTRICTION: 0
OD_INFERIOR_NASAL_RESTRICTION: 0
OS_INFERIOR_TEMPORAL_RESTRICTION: 0
OS_INFERIOR_NASAL_RESTRICTION: 0
OS_NORMAL: 1
OD_INFERIOR_TEMPORAL_RESTRICTION: 0

## 2025-08-06 ASSESSMENT — CUP TO DISC RATIO
OS_RATIO: 0.4
OD_RATIO: 0.45

## 2025-08-06 ASSESSMENT — VISUAL ACUITY
OS_SC+: -1
OD_SC: 20/20
METHOD: SNELLEN - LINEAR
OD_SC+: -2
OS_SC: 20/20

## 2025-08-06 ASSESSMENT — TONOMETRY
OS_IOP_MMHG: 12
OD_IOP_MMHG: 14
IOP_METHOD: ICARE

## 2025-08-06 ASSESSMENT — EXTERNAL EXAM - RIGHT EYE: OD_EXAM: NORMAL

## 2025-08-06 ASSESSMENT — SLIT LAMP EXAM - LIDS
COMMENTS: NORMAL
COMMENTS: NORMAL

## 2025-08-06 ASSESSMENT — EXTERNAL EXAM - LEFT EYE: OS_EXAM: NORMAL

## 2025-08-10 ENCOUNTER — MYC MEDICAL ADVICE (OUTPATIENT)
Dept: ENDOCRINOLOGY | Facility: CLINIC | Age: 40
End: 2025-08-10
Payer: COMMERCIAL

## 2025-08-10 DIAGNOSIS — E23.6 PITUITARY CYST: Primary | ICD-10-CM

## 2025-08-20 ENCOUNTER — VIRTUAL VISIT (OUTPATIENT)
Dept: ENDOCRINOLOGY | Facility: CLINIC | Age: 40
End: 2025-08-20
Payer: COMMERCIAL

## 2025-08-20 DIAGNOSIS — E23.6 PITUITARY CYST: Primary | ICD-10-CM

## 2025-08-20 ASSESSMENT — PAIN SCALES - GENERAL: PAINLEVEL_OUTOF10: NO PAIN (0)

## 2025-08-26 ENCOUNTER — MYC MEDICAL ADVICE (OUTPATIENT)
Dept: ENDOCRINOLOGY | Facility: CLINIC | Age: 40
End: 2025-08-26
Payer: COMMERCIAL

## (undated) DEVICE — Device

## (undated) DEVICE — NDL 25GA 2"  8881200441

## (undated) DEVICE — DRAIN JACKSON PRATT RESERVOIR 100ML SU130-1305

## (undated) DEVICE — RETR ELASTIC STAYS LONE STAR BLUNT DUAL LEAD 3550-1G

## (undated) DEVICE — SU SILK 2-0 TIE 12X30" A305H

## (undated) DEVICE — DECANTER VIAL 2006S

## (undated) DEVICE — SUCTION MANIFOLD DORNOCH ULTRA CART UL-CL500

## (undated) DEVICE — BLADE KNIFE SURG 12 371112

## (undated) DEVICE — SU VICRYL 3-0 SH 8X18" UND J864D

## (undated) DEVICE — SU SILK 3-0 TIE 12X30" A304H

## (undated) DEVICE — GLOVE PROTEXIS POWDER FREE SMT 7.5  2D72PT75X

## (undated) DEVICE — SU ETHILON 5-0 PC-3 18" 1865G

## (undated) DEVICE — SU SILK 4-0 TIE 12X30" A303H

## (undated) DEVICE — TUBING SUCTION 10'X3/16" N510

## (undated) DEVICE — PAD CHUX UNDERPAD 23X24" 7136

## (undated) DEVICE — LINEN TOWEL PACK X5 5464

## (undated) DEVICE — LABEL MEDICATION SYSTEM 3303-P

## (undated) DEVICE — NIM ELEC SUBDERMAL NDL 3PAIR/BOX

## (undated) DEVICE — ESU GROUND PAD ADULT W/CORD E7507

## (undated) DEVICE — ESU ELEC BLADE 2.75" COATED/INSULATED E1455

## (undated) DEVICE — PREP POVIDONE IODINE SOLUTION 10% 4OZ

## (undated) DEVICE — CLIP HORIZON SM RED WIDE SLOT 001201

## (undated) DEVICE — SU ETHILON 3-0 PS-1 18" 1663H

## (undated) DEVICE — PACK NEURO MINOR UMMC SNE32MNMU4

## (undated) DEVICE — SU SILK 2-0 SH CR 5X18" C0125

## (undated) DEVICE — DRAIN JACKSON PRATT 10MM FLAT 4/4 PERF SU130-1311

## (undated) DEVICE — DRAIN JACKSON PRATT 07FR ROUND SU130-1320

## (undated) DEVICE — NIM PROBE PRASS INCREMENTING TIP 8225825

## (undated) DEVICE — SOL WATER IRRIG 1000ML BOTTLE 07139-09

## (undated) DEVICE — SU ETHILON 6-0 P-3 18" BLACK 1698G

## (undated) DEVICE — PREP POVIDONE IODINE SCRUB 7.5% 120ML

## (undated) DEVICE — ESU PENCIL SMOKE EVAC W/ROCKER SWITCH 0703-047-000

## (undated) DEVICE — BLADE KNIFE SURG 15 371115

## (undated) DEVICE — PREP SKIN SCRUB TRAY 4461A

## (undated) DEVICE — SOL NACL 0.9% IRRIG 1000ML BOTTLE 2F7124

## (undated) DEVICE — LINEN TOWEL PACK X6 WHITE 5487

## (undated) DEVICE — CLIP HORIZON MED BLUE 002200

## (undated) DEVICE — SPONGE KITTNER 30-101

## (undated) DEVICE — DRAPE STERI TOWEL SM 1000

## (undated) RX ORDER — PROPOFOL 10 MG/ML
INJECTION, EMULSION INTRAVENOUS
Status: DISPENSED
Start: 2018-08-08

## (undated) RX ORDER — CLINDAMYCIN PHOSPHATE 600 MG/50ML
INJECTION, SOLUTION INTRAVENOUS
Status: DISPENSED
Start: 2018-08-08

## (undated) RX ORDER — SODIUM CHLORIDE, SODIUM LACTATE, POTASSIUM CHLORIDE, CALCIUM CHLORIDE 600; 310; 30; 20 MG/100ML; MG/100ML; MG/100ML; MG/100ML
INJECTION, SOLUTION INTRAVENOUS
Status: DISPENSED
Start: 2018-08-08

## (undated) RX ORDER — ONDANSETRON 2 MG/ML
INJECTION INTRAMUSCULAR; INTRAVENOUS
Status: DISPENSED
Start: 2018-08-08

## (undated) RX ORDER — DEXAMETHASONE SODIUM PHOSPHATE 4 MG/ML
INJECTION, SOLUTION INTRA-ARTICULAR; INTRALESIONAL; INTRAMUSCULAR; INTRAVENOUS; SOFT TISSUE
Status: DISPENSED
Start: 2018-08-08

## (undated) RX ORDER — EPHEDRINE SULFATE 50 MG/ML
INJECTION, SOLUTION INTRAMUSCULAR; INTRAVENOUS; SUBCUTANEOUS
Status: DISPENSED
Start: 2018-08-08

## (undated) RX ORDER — OXYCODONE HYDROCHLORIDE 5 MG/1
TABLET ORAL
Status: DISPENSED
Start: 2018-08-08

## (undated) RX ORDER — FENTANYL CITRATE 50 UG/ML
INJECTION, SOLUTION INTRAMUSCULAR; INTRAVENOUS
Status: DISPENSED
Start: 2018-08-08

## (undated) RX ORDER — HYDROMORPHONE HYDROCHLORIDE 1 MG/ML
INJECTION, SOLUTION INTRAMUSCULAR; INTRAVENOUS; SUBCUTANEOUS
Status: DISPENSED
Start: 2018-08-08

## (undated) RX ORDER — PHENYLEPHRINE HCL IN 0.9% NACL 1 MG/10 ML
SYRINGE (ML) INTRAVENOUS
Status: DISPENSED
Start: 2018-08-08